# Patient Record
Sex: MALE | Race: WHITE | NOT HISPANIC OR LATINO | Employment: FULL TIME | URBAN - METROPOLITAN AREA
[De-identification: names, ages, dates, MRNs, and addresses within clinical notes are randomized per-mention and may not be internally consistent; named-entity substitution may affect disease eponyms.]

---

## 2020-03-04 ENCOUNTER — HOSPITAL ENCOUNTER (INPATIENT)
Facility: HOSPITAL | Age: 60
LOS: 7 days | Discharge: HOME | DRG: 854 | End: 2020-03-11
Attending: EMERGENCY MEDICINE | Admitting: STUDENT IN AN ORGANIZED HEALTH CARE EDUCATION/TRAINING PROGRAM
Payer: COMMERCIAL

## 2020-03-04 ENCOUNTER — APPOINTMENT (EMERGENCY)
Dept: RADIOLOGY | Facility: HOSPITAL | Age: 60
DRG: 854 | End: 2020-03-04
Payer: COMMERCIAL

## 2020-03-04 DIAGNOSIS — H66.90 OTITIS MEDIA: Primary | ICD-10-CM

## 2020-03-04 DIAGNOSIS — H70.91 MASTOIDITIS OF RIGHT SIDE: ICD-10-CM

## 2020-03-04 DIAGNOSIS — C83.30 DIFFUSE LARGE B CELL LYMPHOMA (HCC): ICD-10-CM

## 2020-03-04 DIAGNOSIS — H70.001 ACUTE MASTOIDITIS OF RIGHT SIDE: ICD-10-CM

## 2020-03-04 DIAGNOSIS — R09.82 POST-NASAL DRIP: ICD-10-CM

## 2020-03-04 DIAGNOSIS — R42 VERTIGO: ICD-10-CM

## 2020-03-04 PROBLEM — A41.9 SEPSIS (HCC): Status: ACTIVE | Noted: 2020-03-04

## 2020-03-04 PROBLEM — E66.9 OBESITY: Status: ACTIVE | Noted: 2020-03-04

## 2020-03-04 LAB
ALBUMIN SERPL BCP-MCNC: 3.9 G/DL (ref 3.5–5)
ALP SERPL-CCNC: 83 U/L (ref 46–116)
ALT SERPL W P-5'-P-CCNC: 23 U/L (ref 12–78)
ANION GAP SERPL CALCULATED.3IONS-SCNC: 8 MMOL/L (ref 4–13)
APTT PPP: 40 SECONDS (ref 23–37)
AST SERPL W P-5'-P-CCNC: 11 U/L (ref 5–45)
BASOPHILS # BLD AUTO: 0.03 THOUSANDS/ΜL (ref 0–0.1)
BASOPHILS NFR BLD AUTO: 0 % (ref 0–1)
BILIRUB SERPL-MCNC: 0.9 MG/DL (ref 0.2–1)
BUN SERPL-MCNC: 10 MG/DL (ref 5–25)
CALCIUM SERPL-MCNC: 9.1 MG/DL (ref 8.3–10.1)
CHLORIDE SERPL-SCNC: 100 MMOL/L (ref 100–108)
CO2 SERPL-SCNC: 29 MMOL/L (ref 21–32)
CREAT SERPL-MCNC: 1.01 MG/DL (ref 0.6–1.3)
EOSINOPHIL # BLD AUTO: 0 THOUSAND/ΜL (ref 0–0.61)
EOSINOPHIL NFR BLD AUTO: 0 % (ref 0–6)
ERYTHROCYTE [DISTWIDTH] IN BLOOD BY AUTOMATED COUNT: 12.6 % (ref 11.6–15.1)
GFR SERPL CREATININE-BSD FRML MDRD: 81 ML/MIN/1.73SQ M
GLUCOSE SERPL-MCNC: 144 MG/DL (ref 65–140)
HCT VFR BLD AUTO: 45 % (ref 36.5–49.3)
HGB BLD-MCNC: 15 G/DL (ref 12–17)
IMM GRANULOCYTES # BLD AUTO: 0.08 THOUSAND/UL (ref 0–0.2)
IMM GRANULOCYTES NFR BLD AUTO: 1 % (ref 0–2)
INR PPP: 1.07 (ref 0.91–1.09)
LYMPHOCYTES # BLD AUTO: 0.81 THOUSANDS/ΜL (ref 0.6–4.47)
LYMPHOCYTES NFR BLD AUTO: 5 % (ref 14–44)
MCH RBC QN AUTO: 31 PG (ref 26.8–34.3)
MCHC RBC AUTO-ENTMCNC: 33.3 G/DL (ref 31.4–37.4)
MCV RBC AUTO: 93 FL (ref 82–98)
MONOCYTES # BLD AUTO: 0.83 THOUSAND/ΜL (ref 0.17–1.22)
MONOCYTES NFR BLD AUTO: 5 % (ref 4–12)
NEUTROPHILS # BLD AUTO: 14.98 THOUSANDS/ΜL (ref 1.85–7.62)
NEUTS SEG NFR BLD AUTO: 89 % (ref 43–75)
NRBC BLD AUTO-RTO: 0 /100 WBCS
PLATELET # BLD AUTO: 153 THOUSANDS/UL (ref 149–390)
PMV BLD AUTO: 8.3 FL (ref 8.9–12.7)
POTASSIUM SERPL-SCNC: 4.4 MMOL/L (ref 3.5–5.3)
PROT SERPL-MCNC: 7.2 G/DL (ref 6.4–8.2)
PROTHROMBIN TIME: 11.5 SECONDS (ref 9.8–12)
RBC # BLD AUTO: 4.84 MILLION/UL (ref 3.88–5.62)
SODIUM SERPL-SCNC: 137 MMOL/L (ref 136–145)
TROPONIN I SERPL-MCNC: <0.02 NG/ML
WBC # BLD AUTO: 16.73 THOUSAND/UL (ref 4.31–10.16)

## 2020-03-04 PROCEDURE — 99285 EMERGENCY DEPT VISIT HI MDM: CPT | Performed by: EMERGENCY MEDICINE

## 2020-03-04 PROCEDURE — 85730 THROMBOPLASTIN TIME PARTIAL: CPT | Performed by: EMERGENCY MEDICINE

## 2020-03-04 PROCEDURE — 70498 CT ANGIOGRAPHY NECK: CPT

## 2020-03-04 PROCEDURE — 80053 COMPREHEN METABOLIC PANEL: CPT | Performed by: EMERGENCY MEDICINE

## 2020-03-04 PROCEDURE — 70496 CT ANGIOGRAPHY HEAD: CPT

## 2020-03-04 PROCEDURE — 99223 1ST HOSP IP/OBS HIGH 75: CPT | Performed by: NURSE PRACTITIONER

## 2020-03-04 PROCEDURE — 84484 ASSAY OF TROPONIN QUANT: CPT | Performed by: EMERGENCY MEDICINE

## 2020-03-04 PROCEDURE — 96375 TX/PRO/DX INJ NEW DRUG ADDON: CPT

## 2020-03-04 PROCEDURE — 85025 COMPLETE CBC W/AUTO DIFF WBC: CPT | Performed by: EMERGENCY MEDICINE

## 2020-03-04 PROCEDURE — 96374 THER/PROPH/DIAG INJ IV PUSH: CPT

## 2020-03-04 PROCEDURE — 87040 BLOOD CULTURE FOR BACTERIA: CPT | Performed by: EMERGENCY MEDICINE

## 2020-03-04 PROCEDURE — 93005 ELECTROCARDIOGRAM TRACING: CPT

## 2020-03-04 PROCEDURE — 36415 COLL VENOUS BLD VENIPUNCTURE: CPT | Performed by: EMERGENCY MEDICINE

## 2020-03-04 PROCEDURE — 99285 EMERGENCY DEPT VISIT HI MDM: CPT

## 2020-03-04 PROCEDURE — 85610 PROTHROMBIN TIME: CPT | Performed by: EMERGENCY MEDICINE

## 2020-03-04 RX ORDER — ONDANSETRON 2 MG/ML
4 INJECTION INTRAMUSCULAR; INTRAVENOUS EVERY 6 HOURS PRN
Status: DISCONTINUED | OUTPATIENT
Start: 2020-03-04 | End: 2020-03-11 | Stop reason: HOSPADM

## 2020-03-04 RX ORDER — MAGNESIUM HYDROXIDE/ALUMINUM HYDROXICE/SIMETHICONE 120; 1200; 1200 MG/30ML; MG/30ML; MG/30ML
30 SUSPENSION ORAL EVERY 6 HOURS PRN
Status: DISCONTINUED | OUTPATIENT
Start: 2020-03-04 | End: 2020-03-11 | Stop reason: HOSPADM

## 2020-03-04 RX ORDER — ONDANSETRON 2 MG/ML
4 INJECTION INTRAMUSCULAR; INTRAVENOUS ONCE
Status: COMPLETED | OUTPATIENT
Start: 2020-03-04 | End: 2020-03-04

## 2020-03-04 RX ORDER — ACETAMINOPHEN 325 MG/1
650 TABLET ORAL EVERY 6 HOURS PRN
Status: DISCONTINUED | OUTPATIENT
Start: 2020-03-04 | End: 2020-03-05

## 2020-03-04 RX ORDER — AZELASTINE 1 MG/ML
1 SPRAY, METERED NASAL 2 TIMES DAILY
Status: DISCONTINUED | OUTPATIENT
Start: 2020-03-04 | End: 2020-03-11 | Stop reason: HOSPADM

## 2020-03-04 RX ORDER — FLUTICASONE PROPIONATE 50 MCG
1 SPRAY, SUSPENSION (ML) NASAL DAILY
Status: DISCONTINUED | OUTPATIENT
Start: 2020-03-05 | End: 2020-03-11 | Stop reason: HOSPADM

## 2020-03-04 RX ORDER — KETOROLAC TROMETHAMINE 30 MG/ML
15 INJECTION, SOLUTION INTRAMUSCULAR; INTRAVENOUS EVERY 6 HOURS PRN
Status: DISCONTINUED | OUTPATIENT
Start: 2020-03-04 | End: 2020-03-05

## 2020-03-04 RX ORDER — DIAZEPAM 5 MG/1
5 TABLET ORAL EVERY 6 HOURS PRN
Status: DISCONTINUED | OUTPATIENT
Start: 2020-03-04 | End: 2020-03-09

## 2020-03-04 RX ORDER — KETOROLAC TROMETHAMINE 30 MG/ML
30 INJECTION, SOLUTION INTRAMUSCULAR; INTRAVENOUS ONCE
Status: COMPLETED | OUTPATIENT
Start: 2020-03-04 | End: 2020-03-04

## 2020-03-04 RX ORDER — LORATADINE 10 MG/1
10 TABLET ORAL DAILY
Status: DISCONTINUED | OUTPATIENT
Start: 2020-03-05 | End: 2020-03-11 | Stop reason: HOSPADM

## 2020-03-04 RX ORDER — ONDANSETRON 2 MG/ML
INJECTION INTRAMUSCULAR; INTRAVENOUS
Status: DISPENSED
Start: 2020-03-04 | End: 2020-03-05

## 2020-03-04 RX ORDER — ACYCLOVIR 200 MG/1
400 CAPSULE ORAL EVERY 12 HOURS SCHEDULED
Status: DISCONTINUED | OUTPATIENT
Start: 2020-03-04 | End: 2020-03-11 | Stop reason: HOSPADM

## 2020-03-04 RX ORDER — PSEUDOEPHEDRINE HYDROCHLORIDE 30 MG/1
60 TABLET ORAL EVERY 6 HOURS PRN
Status: DISCONTINUED | OUTPATIENT
Start: 2020-03-04 | End: 2020-03-08

## 2020-03-04 RX ORDER — CEFAZOLIN SODIUM 2 G/50ML
2000 SOLUTION INTRAVENOUS EVERY 8 HOURS
Status: DISCONTINUED | OUTPATIENT
Start: 2020-03-04 | End: 2020-03-09 | Stop reason: ALTCHOICE

## 2020-03-04 RX ORDER — DIAZEPAM 5 MG/1
5 TABLET ORAL ONCE
Status: COMPLETED | OUTPATIENT
Start: 2020-03-04 | End: 2020-03-04

## 2020-03-04 RX ADMIN — VANCOMYCIN HYDROCHLORIDE 1250 MG: 10 INJECTION, POWDER, LYOPHILIZED, FOR SOLUTION INTRAVENOUS at 21:15

## 2020-03-04 RX ADMIN — ONDANSETRON 4 MG: 2 INJECTION INTRAMUSCULAR; INTRAVENOUS at 16:55

## 2020-03-04 RX ADMIN — KETOROLAC TROMETHAMINE 30 MG: 30 INJECTION, SOLUTION INTRAMUSCULAR at 18:11

## 2020-03-04 RX ADMIN — IOHEXOL 85 ML: 350 INJECTION, SOLUTION INTRAVENOUS at 18:45

## 2020-03-04 RX ADMIN — DIAZEPAM 5 MG: 5 TABLET ORAL at 16:55

## 2020-03-04 RX ADMIN — ONDANSETRON 4 MG: 2 INJECTION INTRAMUSCULAR; INTRAVENOUS at 20:08

## 2020-03-04 RX ADMIN — AZELASTINE HYDROCHLORIDE 1 SPRAY: 137 SPRAY, METERED NASAL at 23:44

## 2020-03-04 RX ADMIN — CEFAZOLIN SODIUM 2000 MG: 2 SOLUTION INTRAVENOUS at 23:32

## 2020-03-04 RX ADMIN — Medication 4.5 G: at 20:17

## 2020-03-04 NOTE — ED NOTES
Patient transported to [address] [place] [address], [address] [place] [address]  03/04/20 7148

## 2020-03-04 NOTE — ED PROVIDER NOTES
History  Chief Complaint   Patient presents with    Earache     c/o R ear discomfort with fluid in the ear for 6 weeks has seen ent and has follow up, balance getting worse, today had headache and started vomiting     62 yo male  from [place] c/o right ear fullness with associated dizziness x 6 weeks  Seen at urgent care center initially and had ear wax removed  No improvement so saw ENT who told him he had fluid in the middle ear and put him on antihistamine and did a hearing test   Yesterday, dizziness and right ear pressure got severely worse  This morning was tolerable so drove to [facility] and did delivery but at noon (4 75 hours ago) felt worse again  Had spinning dizziness with nausea and vomiting when he moved and right ear is now painful  He can't walk or drive  He says he tried to call local ENTs but couldn't get appointment to see anyone right away  He called his ENT in [place] who told him to come to ER  He wants someone to do surgery and place a tube in the ear right now  No chest pain, fever, sob  History provided by:  Patient   used: No    Earache   Associated symptoms: headaches and vomiting    Associated symptoms: no abdominal pain, no congestion, no cough, no diarrhea, no fever, no hearing loss, no rash and no sore throat        None       Past Medical History:   Diagnosis Date    Lymphoma [facility])        Past Surgical History:   Procedure Laterality Date    GALLBLADDER SURGERY         History reviewed  No pertinent family history  I have reviewed and agree with the history as documented  E-Cigarette/Vaping    E-Cigarette Use Never User      E-Cigarette/Vaping Substances     Social History     Tobacco Use    Smoking status: Former Smoker    Smokeless tobacco: Never Used   Substance Use Topics    Alcohol use: Not Currently    Drug use: Not Currently       Review of Systems   Constitutional: Negative  Negative for chills and fever     HENT: Positive for ear pain  Negative for congestion, hearing loss and sore throat  Eyes: Negative  Respiratory: Negative  Negative for cough and shortness of breath  Cardiovascular: Negative  Negative for chest pain and leg swelling  Gastrointestinal: Positive for nausea and vomiting  Negative for abdominal pain and diarrhea  Genitourinary: Negative  Negative for dysuria, flank pain and hematuria  Musculoskeletal: Negative  Negative for back pain and myalgias  Skin: Negative  Negative for rash and wound  Neurological: Positive for dizziness and headaches  Negative for syncope  Psychiatric/Behavioral: Negative  Negative for confusion and hallucinations  The patient is not nervous/anxious  All other systems reviewed and are negative  Physical Exam  Physical Exam   Constitutional: He is oriented to person, place, and time  He appears well-developed and well-nourished  He appears distressed  HENT:   Head: Normocephalic and atraumatic  Right Ear: Tympanic membrane is erythematous  Left Ear: Tympanic membrane normal    Right TM is red in center, vascular around edges, appears yellowed and thickened and can't see landmarks behind it   + ttp behind right ear/mastoid area but area is not grossly red or warm to touch  Eyes: Pupils are equal, round, and reactive to light  Conjunctivae and EOM are normal  No scleral icterus  Neck: Normal range of motion  Neck supple  Cardiovascular: Normal rate, regular rhythm and normal heart sounds  No murmur heard  Pulmonary/Chest: Effort normal  No respiratory distress  He has decreased breath sounds  He exhibits no tenderness  Abdominal: Soft  Bowel sounds are normal  He exhibits no distension  There is no tenderness  Musculoskeletal: Normal range of motion  He exhibits no edema, tenderness or deformity  Neurological: He is alert and oriented to person, place, and time  No cranial nerve deficit  He exhibits normal muscle tone     Speech intact, finger to nose is a little tremulous but he can do it  Motor intact throughout  No drift, no droop   Skin: Skin is warm and dry  No rash noted  He is not diaphoretic  No erythema  No pallor  Psychiatric: He has a normal mood and affect  His behavior is normal    Nursing note and vitals reviewed        Vital Signs  ED Triage Vitals [03/04/20 1620]   Temperature Pulse Respirations Blood Pressure SpO2   98 8 °F (37 1 °C) (!) 108 20 (!) 189/105 95 %      Temp Source Heart Rate Source Patient Position - Orthostatic VS BP Location FiO2 (%)   Tympanic Monitor Sitting Right arm --      Pain Score       Worst Possible Pain           Vitals:    03/04/20 1745 03/04/20 1800 03/04/20 1815 03/04/20 1913   BP:    128/80   Pulse: 104 105 105 103   Patient Position - Orthostatic VS:    Lying         Visual Acuity      ED Medications  Medications   piperacillin-tazobactam (ZOSYN) IVPB 4 5 g (has no administration in time range)   vancomycin (VANCOCIN) 1,250 mg in sodium chloride 0 9 % 250 mL IVPB (has no administration in time range)   diazepam (VALIUM) tablet 5 mg (5 mg Oral Given 3/4/20 1655)   ondansetron (ZOFRAN) injection 4 mg (4 mg Intravenous Given 3/4/20 1655)   ketorolac (TORADOL) injection 30 mg (30 mg Intravenous Given 3/4/20 1811)   iohexol (OMNIPAQUE) 350 MG/ML injection (MULTI-DOSE) 85 mL (85 mL Intravenous Given 3/4/20 1845)       Diagnostic Studies  Results Reviewed     Procedure Component Value Units Date/Time    Blood culture #1 [[id number]]     Lab Status:  No result Specimen:  Blood     Blood culture #2 [[id number]]     Lab Status:  No result Specimen:  Blood     Protime-INR [[id number]]  (Normal) Collected:  03/04/20 1651    Lab Status:  Final result Specimen:  Blood from Arm, Left Updated:  03/04/20 1720     Protime 11 5 seconds      INR 1 07    APTT [[id number]]  (Abnormal) Collected:  03/04/20 1651    Lab Status:  Final result Specimen:  Blood from Arm, Left Updated:  03/04/20 1720     PTT 40 seconds     Troponin I [[id number]]  (Normal) Collected:  03/04/20 1651    Lab Status:  Final result Specimen:  Blood from Arm, Left Updated:  03/04/20 1719     Troponin I <0 02 ng/mL     Comprehensive metabolic panel [[id number]]  (Abnormal) Collected:  03/04/20 1651    Lab Status:  Final result Specimen:  Blood from Arm, Left Updated:  03/04/20 1715     Sodium 137 mmol/L      Potassium 4 4 mmol/L      Chloride 100 mmol/L      CO2 29 mmol/L      ANION GAP 8 mmol/L      BUN 10 mg/dL      Creatinine 1 01 mg/dL      Glucose 144 mg/dL      Calcium 9 1 mg/dL      AST 11 U/L      ALT 23 U/L      Alkaline Phosphatase 83 U/L      Total Protein 7 2 g/dL      Albumin 3 9 g/dL      Total Bilirubin 0 90 mg/dL      eGFR 81 ml/min/1 73sq m     Narrative:       [facility] guidelines for Chronic Kidney Disease (CKD):     Stage 1 with normal or high GFR (GFR > 90 mL/min/1 73 square meters)    Stage 2 Mild CKD (GFR = 60-89 mL/min/1 73 square meters)    Stage 3A Moderate CKD (GFR = 45-59 mL/min/1 73 square meters)    Stage 3B Moderate CKD (GFR = 30-44 mL/min/1 73 square meters)    Stage 4 Severe CKD (GFR = 15-29 mL/min/1 73 square meters)    Stage 5 End Stage CKD (GFR <15 mL/min/1 73 square meters)  Note: GFR calculation is accurate only with a steady state creatinine    CBC and differential [[id number]]  (Abnormal) Collected:  03/04/20 1651    Lab Status:  Final result Specimen:  Blood from Arm, Left Updated:  03/04/20 1657     WBC 16 73 Thousand/uL      RBC 4 84 Million/uL      Hemoglobin 15 0 g/dL      Hematocrit 45 0 %      MCV 93 fL      MCH 31 0 pg      MCHC 33 3 g/dL      RDW 12 6 %      MPV 8 3 fL      Platelets 177 Thousands/uL      nRBC 0 /100 WBCs      Neutrophils Relative 89 %      Immat GRANS % 1 %      Lymphocytes Relative 5 %      Monocytes Relative 5 %      Eosinophils Relative 0 %      Basophils Relative 0 %      Neutrophils Absolute 14 98 Thousands/µL      Immature Grans Absolute 0 08 Thousand/uL Lymphocytes Absolute 0 81 Thousands/µL      Monocytes Absolute 0 83 Thousand/µL      Eosinophils Absolute 0 00 Thousand/µL      Basophils Absolute 0 03 Thousands/µL                  CTA head and neck with and without contrast   Final Result by Brittney Elliott MD (03/04 1914)         1  No evidence of acute intracranial hemorrhage  2  No evidence of hemodynamic significant stenosis, aneurysm or dissection  3  Suspect right otomastoiditis  4  Pansinusitis  Workstation performed: [id number]                    Procedures  ECG 12 Lead Documentation Only  Date/Time: 3/4/2020 4:56 PM  Performed by: Naa Carrillo MD  Authorized by: Naa Carrillo MD     Indications / Diagnosis:  Dizziness  ECG reviewed by me, the ED Provider: yes    Patient location:  ED  Previous ECG:     Previous ECG:  Unavailable  Interpretation:     Interpretation: abnormal    Rate:     ECG rate:  107    ECG rate assessment: tachycardic    Rhythm:     Rhythm: sinus tachycardia    Ectopy:     Ectopy: PAC    QRS:     QRS axis:  Normal  Conduction:     Conduction: normal    ST segments:     ST segments:  Normal  T waves:     T waves: normal    Q waves:     Q waves:  III  Comments:      Low voltage             ED Course                               MDM  Number of Diagnoses or Management Options  Mastoiditis of right side:   Otitis media:   Vertigo:   Diagnosis management comments: 80 - discussed with ENT on call who will see pt  Tomorrow  Will give IV abx  Pt  Does feel a little better after the valium, he is able to lay down comfortably but still got very dizzy when he had to move for the CT scan    Will admit to hospitalist         Disposition  Final diagnoses:   Otitis media   Mastoiditis of right side   Vertigo     Time reflects when diagnosis was documented in both MDM as applicable and the Disposition within this note     Time User Action Codes Description Comment    7/6/7640  6:11 PM Coby SHULTZ Add [I17 32] Otitis media 3/9/6080  4:97 PM Jonnathan SHULTZ Add [O56 77] Mastoiditis of right side     1/7/8933  0:57 PM Jonnathan SHULTZ Add [J38] Vertigo       ED Disposition     ED Disposition Condition Date/Time Comment    Admit Stable Wed Mar 4, 2020  7:47 PM Case was discussed with **hospitalist* and the patient's admission status was agreed to be Admission Status: inpatient status        Follow-up Information    None         Patient's Medications    No medications on file     No discharge procedures on file      PDMP Review     None          ED Provider  Electronically Signed by           Dinh Tucker MD  90/63/89 5976       Dinh Tucker MD  13/77/07 6622

## 2020-03-04 NOTE — ED NOTES
Pt requested of pain medication for his L ear, Dr Sadie Kowalski made aware  Pt is awaiting to go to CT        Ivelisse Tafoya RN  03/04/20 7698

## 2020-03-05 PROBLEM — E66.09 CLASS 2 OBESITY DUE TO EXCESS CALORIES WITHOUT SERIOUS COMORBIDITY WITH BODY MASS INDEX (BMI) OF 35.0 TO 35.9 IN ADULT: Status: ACTIVE | Noted: 2020-03-04

## 2020-03-05 LAB
ALBUMIN SERPL BCP-MCNC: 3.3 G/DL (ref 3.5–5)
ALP SERPL-CCNC: 70 U/L (ref 46–116)
ALT SERPL W P-5'-P-CCNC: 18 U/L (ref 12–78)
ANION GAP SERPL CALCULATED.3IONS-SCNC: 8 MMOL/L (ref 4–13)
AST SERPL W P-5'-P-CCNC: 11 U/L (ref 5–45)
ATRIAL RATE: 107 BPM
BASOPHILS # BLD AUTO: 0.02 THOUSANDS/ΜL (ref 0–0.1)
BASOPHILS NFR BLD AUTO: 0 % (ref 0–1)
BILIRUB SERPL-MCNC: 0.8 MG/DL (ref 0.2–1)
BUN SERPL-MCNC: 13 MG/DL (ref 5–25)
CALCIUM SERPL-MCNC: 8.5 MG/DL (ref 8.3–10.1)
CHLORIDE SERPL-SCNC: 101 MMOL/L (ref 100–108)
CO2 SERPL-SCNC: 28 MMOL/L (ref 21–32)
CREAT SERPL-MCNC: 1.09 MG/DL (ref 0.6–1.3)
EOSINOPHIL # BLD AUTO: 0.01 THOUSAND/ΜL (ref 0–0.61)
EOSINOPHIL NFR BLD AUTO: 0 % (ref 0–6)
ERYTHROCYTE [DISTWIDTH] IN BLOOD BY AUTOMATED COUNT: 12.6 % (ref 11.6–15.1)
GFR SERPL CREATININE-BSD FRML MDRD: 74 ML/MIN/1.73SQ M
GLUCOSE SERPL-MCNC: 101 MG/DL (ref 65–140)
HCT VFR BLD AUTO: 42.9 % (ref 36.5–49.3)
HGB BLD-MCNC: 14.1 G/DL (ref 12–17)
IMM GRANULOCYTES # BLD AUTO: 0.09 THOUSAND/UL (ref 0–0.2)
IMM GRANULOCYTES NFR BLD AUTO: 1 % (ref 0–2)
LYMPHOCYTES # BLD AUTO: 1.05 THOUSANDS/ΜL (ref 0.6–4.47)
LYMPHOCYTES NFR BLD AUTO: 8 % (ref 14–44)
MAGNESIUM SERPL-MCNC: 2.1 MG/DL (ref 1.6–2.6)
MCH RBC QN AUTO: 30.9 PG (ref 26.8–34.3)
MCHC RBC AUTO-ENTMCNC: 32.9 G/DL (ref 31.4–37.4)
MCV RBC AUTO: 94 FL (ref 82–98)
MONOCYTES # BLD AUTO: 1.08 THOUSAND/ΜL (ref 0.17–1.22)
MONOCYTES NFR BLD AUTO: 8 % (ref 4–12)
NEUTROPHILS # BLD AUTO: 10.55 THOUSANDS/ΜL (ref 1.85–7.62)
NEUTS SEG NFR BLD AUTO: 83 % (ref 43–75)
NRBC BLD AUTO-RTO: 0 /100 WBCS
P AXIS: 54 DEGREES
PHOSPHATE SERPL-MCNC: 2.5 MG/DL (ref 2.7–4.5)
PLATELET # BLD AUTO: 159 THOUSANDS/UL (ref 149–390)
PMV BLD AUTO: 8.5 FL (ref 8.9–12.7)
POTASSIUM SERPL-SCNC: 3.6 MMOL/L (ref 3.5–5.3)
PR INTERVAL: 162 MS
PROT SERPL-MCNC: 6.3 G/DL (ref 6.4–8.2)
QRS AXIS: 32 DEGREES
QRSD INTERVAL: 84 MS
QT INTERVAL: 344 MS
QTC INTERVAL: 459 MS
RBC # BLD AUTO: 4.57 MILLION/UL (ref 3.88–5.62)
SODIUM SERPL-SCNC: 137 MMOL/L (ref 136–145)
T WAVE AXIS: 31 DEGREES
VENTRICULAR RATE: 107 BPM
WBC # BLD AUTO: 12.8 THOUSAND/UL (ref 4.31–10.16)

## 2020-03-05 PROCEDURE — 99221 1ST HOSP IP/OBS SF/LOW 40: CPT | Performed by: PHYSICIAN ASSISTANT

## 2020-03-05 PROCEDURE — 84100 ASSAY OF PHOSPHORUS: CPT | Performed by: NURSE PRACTITIONER

## 2020-03-05 PROCEDURE — 93010 ELECTROCARDIOGRAM REPORT: CPT | Performed by: INTERNAL MEDICINE

## 2020-03-05 PROCEDURE — 83735 ASSAY OF MAGNESIUM: CPT | Performed by: NURSE PRACTITIONER

## 2020-03-05 PROCEDURE — 80053 COMPREHEN METABOLIC PANEL: CPT | Performed by: NURSE PRACTITIONER

## 2020-03-05 PROCEDURE — 99233 SBSQ HOSP IP/OBS HIGH 50: CPT | Performed by: STUDENT IN AN ORGANIZED HEALTH CARE EDUCATION/TRAINING PROGRAM

## 2020-03-05 PROCEDURE — 85025 COMPLETE CBC W/AUTO DIFF WBC: CPT | Performed by: NURSE PRACTITIONER

## 2020-03-05 RX ORDER — OXYCODONE HYDROCHLORIDE 5 MG/1
5 TABLET ORAL EVERY 4 HOURS PRN
Status: DISCONTINUED | OUTPATIENT
Start: 2020-03-05 | End: 2020-03-09

## 2020-03-05 RX ORDER — PROMETHAZINE HYDROCHLORIDE 25 MG/ML
12.5 INJECTION, SOLUTION INTRAMUSCULAR; INTRAVENOUS ONCE
Status: COMPLETED | OUTPATIENT
Start: 2020-03-06 | End: 2020-03-06

## 2020-03-05 RX ORDER — ACETAMINOPHEN 325 MG/1
975 TABLET ORAL EVERY 6 HOURS SCHEDULED
Status: DISCONTINUED | OUTPATIENT
Start: 2020-03-05 | End: 2020-03-09

## 2020-03-05 RX ORDER — KETOROLAC TROMETHAMINE 30 MG/ML
15 INJECTION, SOLUTION INTRAMUSCULAR; INTRAVENOUS EVERY 6 HOURS SCHEDULED
Status: COMPLETED | OUTPATIENT
Start: 2020-03-05 | End: 2020-03-06

## 2020-03-05 RX ORDER — OXYCODONE HYDROCHLORIDE AND ACETAMINOPHEN 5; 325 MG/1; MG/1
1 TABLET ORAL EVERY 8 HOURS PRN
Status: DISCONTINUED | OUTPATIENT
Start: 2020-03-05 | End: 2020-03-05

## 2020-03-05 RX ADMIN — FLUTICASONE PROPIONATE 1 SPRAY: 50 SPRAY, METERED NASAL at 08:24

## 2020-03-05 RX ADMIN — LORATADINE 10 MG: 10 TABLET ORAL at 08:23

## 2020-03-05 RX ADMIN — METRONIDAZOLE 500 MG: 500 INJECTION, SOLUTION INTRAVENOUS at 08:29

## 2020-03-05 RX ADMIN — ONDANSETRON 4 MG: 2 INJECTION INTRAMUSCULAR; INTRAVENOUS at 12:29

## 2020-03-05 RX ADMIN — DIAZEPAM 5 MG: 5 TABLET ORAL at 08:00

## 2020-03-05 RX ADMIN — OXYCODONE HYDROCHLORIDE 5 MG: 5 TABLET ORAL at 11:33

## 2020-03-05 RX ADMIN — KETOROLAC TROMETHAMINE 15 MG: 30 INJECTION, SOLUTION INTRAMUSCULAR at 00:10

## 2020-03-05 RX ADMIN — ACYCLOVIR 400 MG: 200 CAPSULE ORAL at 08:25

## 2020-03-05 RX ADMIN — ENOXAPARIN SODIUM 40 MG: 40 INJECTION SUBCUTANEOUS at 08:23

## 2020-03-05 RX ADMIN — PSEUDOEPHEDRINE HCL 60 MG: 30 TABLET, COATED ORAL at 08:27

## 2020-03-05 RX ADMIN — METRONIDAZOLE 500 MG: 500 INJECTION, SOLUTION INTRAVENOUS at 00:10

## 2020-03-05 RX ADMIN — KETOROLAC TROMETHAMINE 15 MG: 30 INJECTION, SOLUTION INTRAMUSCULAR at 18:43

## 2020-03-05 RX ADMIN — CEFAZOLIN SODIUM 2000 MG: 2 SOLUTION INTRAVENOUS at 15:35

## 2020-03-05 RX ADMIN — KETOROLAC TROMETHAMINE 15 MG: 30 INJECTION, SOLUTION INTRAMUSCULAR at 11:33

## 2020-03-05 RX ADMIN — ONDANSETRON 4 MG: 2 INJECTION INTRAMUSCULAR; INTRAVENOUS at 18:55

## 2020-03-05 RX ADMIN — METRONIDAZOLE 500 MG: 500 INJECTION, SOLUTION INTRAVENOUS at 16:30

## 2020-03-05 RX ADMIN — ACETAMINOPHEN 975 MG: 325 TABLET, FILM COATED ORAL at 23:46

## 2020-03-05 RX ADMIN — ACYCLOVIR 400 MG: 200 CAPSULE ORAL at 21:35

## 2020-03-05 RX ADMIN — CEFAZOLIN SODIUM 2000 MG: 2 SOLUTION INTRAVENOUS at 07:37

## 2020-03-05 RX ADMIN — ACETAMINOPHEN 975 MG: 325 TABLET, FILM COATED ORAL at 11:33

## 2020-03-05 RX ADMIN — OXYCODONE HYDROCHLORIDE AND ACETAMINOPHEN 1 TABLET: 5; 325 TABLET ORAL at 05:25

## 2020-03-05 RX ADMIN — CEFAZOLIN SODIUM 2000 MG: 2 SOLUTION INTRAVENOUS at 23:51

## 2020-03-05 RX ADMIN — AZELASTINE HYDROCHLORIDE 1 SPRAY: 137 SPRAY, METERED NASAL at 18:45

## 2020-03-05 RX ADMIN — ACYCLOVIR 400 MG: 200 CAPSULE ORAL at 00:10

## 2020-03-05 NOTE — ASSESSMENT & PLAN NOTE
Has been in remission since 2015  · Continue home medication, Acyclovir 400 mg twice daily  · Outpatient Oncology follow-up

## 2020-03-05 NOTE — PROGRESS NOTES
Progress Note Gregory Blair 1960, 61 y o  male MRN: [id number]    Unit/Bed#: [address] [place] [address] Encounter: [id number]    Primary Care Provider: No primary care provider on file  Date and time admitted to hospital: 3/4/2020  4:24 PM        Sepsis [facility])  Assessment & Plan  POA, as evidence by tachycardia and leukocytosis in the setting of otomastoiditis   · Follow-up blood cultures  · Monitor WBC- downtrending  · Patient remains afebrile    * Acute mastoiditis of right side  Assessment & Plan  Hst of post nasal drip and right middle ear effusion being treated by ENT with antihistamines and decongestants   Now with worsening pain, vertigo, nausea, and vomiting  CTA head and neck: 'Suspect right otomastoiditis  Pansinusitis '  ER provider spoke with on call ENT provider who will see the patient tomorrow  Given Zosyn and Vancomycin IV x1 in ED   · continue IV Cefazolin + Flagyl  · Consult ENT, recs appreciated  · Continue home antihistamines and decongestants  · Pain control: change regimen to Tylenol 975mg q8hr, toradol 15mg q6hr, oxycodone 5mg q4hr PRN    Class 2 obesity due to excess calories without serious comorbidity with body mass index (BMI) of 35 0 to 35 9 in adult  Assessment & Plan  Body mass index is 35 8 kg/m²  Would benefit from weight loss    History of diffuse large B cell lymphoma (HCC)  Assessment & Plan  Has been in remission since 2015  · Continue home medication, Acyclovir 400 mg twice daily  · Outpatient Oncology follow-up    Post-nasal drip  Assessment & Plan  Complains of excessive postnasal drip for years  Follows outpatient ENT who has him on Sudafed, Zyrtec, Flonase and nasal washes    Has a follow-up with ENT on 3/13/2020  · Continue Flonase, Claritin, and Sudafed as needed  · Trial Astelin nasal spray  · ENT consulted, recs appreciated    Vertigo  Assessment & Plan  Likely due to acute ostitis media, associated with nausea, vomiting, and gait instability x1 day  Was given Valium 5 mg po x1 in ED with improvement in symptoms  · Meclizine 25 mg q8h  · Valium 5 mg po PRN  · PT eval        VTE Pharmacologic Prophylaxis:   Pharmacologic: Enoxaparin (Lovenox)  Mechanical VTE Prophylaxis in Place: Yes    Patient Centered Rounds: I have performed bedside rounds with nursing staff today  Discussions with Specialists or Other Care Team Provider: Yes  Education and Discussions with Family / Patient:Yes  Time Spent for Care: 45 minutes  More than 50% of total time spent on counseling and coordination of care as described above  Current Length of Stay: 1 day(s)  Current Patient Status: Inpatient     Discharge Plan: pending    Code Status: Level 1 - Full Code      Subjective:   Patients right ear pain is much worse this morning  He also has some drainage and its wet  His hearing on that side is decreased  Objective:     Vitals:   Temp (24hrs), Av 8 °F (37 1 °C), Min:97 8 °F (36 6 °C), Max:99 5 °F (37 5 °C)    Temp:  [97 8 °F (36 6 °C)-99 5 °F (37 5 °C)] 97 8 °F (36 6 °C)  HR:  [] 92  Resp:  [13-20] 16  BP: (118-189)/() 121/74  SpO2:  [90 %-97 %] 92 %  Body mass index is 35 8 kg/m²  Input and Output Summary (last 24 hours): Intake/Output Summary (Last 24 hours) at 3/5/2020 1116  Last data filed at 3/5/2020 0838  Gross per 24 hour   Intake    Output 200 ml   Net -200 ml        Physical Exam:     Physical Exam   Constitutional: He is oriented to person, place, and time  He appears well-developed  No distress  HENT:   Head: Normocephalic and atraumatic  Right Ear: There is drainage, swelling and tenderness  There is mastoid tenderness  Tympanic membrane is erythematous  Tympanic membrane mobility is abnormal  Decreased hearing is noted  Cardiovascular: Normal rate, regular rhythm and normal heart sounds  No murmur heard  Pulmonary/Chest: Effort normal and breath sounds normal  No respiratory distress  He has no wheezes  He has no rales     Clear but decreased Abdominal: Soft  Bowel sounds are normal  He exhibits no distension  There is no tenderness  There is no rebound  Musculoskeletal: He exhibits edema (trace)  Neurological: He is alert and oriented to person, place, and time  Skin: Skin is warm and dry  Psychiatric: He has a normal mood and affect  His behavior is normal    Nursing note and vitals reviewed  Additional Data:     Labs:    Results from last 7 days   Lab Units 03/05/20  0524 03/04/20  1651   WBC Thousand/uL 12 80* 16 73*   HEMOGLOBIN g/dL 14 1 15 0   HEMATOCRIT % 42 9 45 0   PLATELETS Thousands/uL 159 153   NEUTROS PCT % 83* 89*     Results from last 7 days   Lab Units 03/05/20  0524 03/04/20  1651   SODIUM mmol/L 137 137   POTASSIUM mmol/L 3 6 4 4   CHLORIDE mmol/L 101 100   CO2 mmol/L 28 29   BUN mg/dL 13 10   CREATININE mg/dL 1 09 1 01   CALCIUM mg/dL 8 5 9 1   TOTAL BILIRUBIN mg/dL 0 80 0 90   ALK PHOS U/L 70 83   ALT U/L 18 23   AST U/L 11 11     Results from last 7 days   Lab Units 03/04/20  1651   INR  1 07     Results from last 7 days   Lab Units 03/04/20  1651   TROPONIN I ng/mL <0 02     No results found for: HGBA1C            * I Have Reviewed All Lab Data Listed Above  * Additional Pertinent Lab Tests Reviewed: All Labs Within Last 24 Hours Reviewed    Imaging:     CTA head and neck with and without contrast   Final Result by Josue Solomon MD (03/04 1914)         1  No evidence of acute intracranial hemorrhage  2  No evidence of hemodynamic significant stenosis, aneurysm or dissection  3  Suspect right otomastoiditis  4  Pansinusitis  Workstation performed: [id number]           Imaging Reports Reviewed by myself    Cultures:   Blood Culture:   Lab Results   Component Value Date    BLOODCX Received in Microbiology Lab  Culture in Progress  03/04/2020    BLOODCX Received in Microbiology Lab  Culture in Progress   03/04/2020     Urine Culture: No results found for: URINECX  Sputum Culture: No components found for: SPUTUMCX  Wound Culture: No results found for: WOUNDCULT    Last 24 Hours Medication List:     Current Facility-Administered Medications:  acetaminophen 650 mg Oral Q6H PRN EDITA Melvin    acyclovir 400 mg Oral Q12H [address] Juany Loza, EDITA    aluminum-magnesium hydroxide-simethicone 30 mL Oral Q6H PRN Juany Loza, EDITA    azelastine 1 spray Each Nare BID Juany Loza, EDITA    cefazolin 2,000 mg Intravenous Q8H Juany Loza, EDITA Last Rate: 2,000 mg (03/05/20 0737)   diazepam 5 mg Oral Q6H PRN Juany Loza, EDITA    enoxaparin 40 mg Subcutaneous Q24H [address] Juany Loza, EDITA    fluticasone 1 spray Each Nare Daily Juany Loza, EDITA    ketorolac 15 mg Intravenous Q6H PRN Juany Loza, EDITA    loratadine 10 mg Oral Daily Juany Loza, EDITA    metroNIDAZOLE 500 mg Intravenous Q8H EDITA Melvin Last Rate: 500 mg (03/05/20 0829)   morphine injection 2 mg Intravenous Q4H PRN Juany Loza, EDITA    ondansetron 4 mg Intravenous Q6H PRN Juany Loza, EDITA    oxyCODONE-acetaminophen 1 tablet Oral Q8H PRN Juany Loza, EDITA    pseudoephedrine 60 mg Oral Q6H PRN EDITA Melvin         Today, Patient Was Seen By: Kal Cardozo MD    ** Please Note: Dragon 360 Dictation voice to text software may have been used in the creation of this document   **

## 2020-03-05 NOTE — H&P
H&P- Bony Brochure 1960, 61 y o  male MRN: [id number]    Unit/Bed#: [address] [place] [address] Encounter: [id number]    Primary Care Provider: No primary care provider on file  Date and time admitted to hospital: 3/4/2020  4:24 PM        * Acute mastoiditis of right side  Assessment & Plan  Hst of post nasal drip and right middle ear effusion being treated by ENT with antihistamines and decongestants   Now with worsening pain, vertigo, nausea, and vomiting  CTA head and neck: 'Suspect right otomastoiditis  Pansinusitis '  ER provider spoke with on call ENT provider who will see the patient tomorrow  Given Zosyn and Vancomycin IV x1 in ED   · Will deescalate antibiotics to IV Cefazolin + Flagyl  · Consult ENT  · Follow-up blood cultures  · Continue home antihistamines and decongestants  · Pain control with Tylenol and Toradol PRN     Sepsis ([address] )  Assessment & Plan  POA, as evidence by tachycardia and leukocytosis in the setting of otomastoiditis   · Continue IV antibiotics as above  · Follow-up blood cultures  · Monitor CBC  · Monitor for worsening signs of infection    Vertigo  Assessment & Plan  Likely due to acute ostitis media   Patient developed vertigo associated with nausea, vomiting, and gait instability this afternoon  Was given Valium 5 mg po x1 in ED with improvement in symptoms  · Meclizine 25 mg q8h  · Valium 5 mg po PRN  · PT eval  · Hold off on IV hydration as patient is eating and drinking well    Post-nasal drip  Assessment & Plan  Complains of excessive postnasal drip for years  Follows outpatient ENT who has him on Sudafed, Zyrtec, Flonase and nasal washes    Has a follow-up with ENT on 3/13/2020  · Continue Flonase, Claritin, and Sudafed as needed  · Trial Astelin nasal spray  · ENT consultation, recommendations appreciated    Obesity  Assessment & Plan  Would benefit from weight loss    History of diffuse large B cell lymphoma (HCC)  Assessment & Plan  Has been in remission since 2015  · Continue home medication, Acyclovir 400 mg twice daily  · Outpatient Oncology follow-up      VTE Prophylaxis: Enoxaparin (Lovenox)  / reason for no mechanical VTE prophylaxis ambulatory    Code Status: Full code, level 1  POLST: POLST form is not discussed and not completed at this time  Discussion with family: None at bedside    Anticipated Length of Stay:  Patient will be admitted on an Inpatient basis with an anticipated length of stay of  Greater than 2 midnights  Justification for Hospital Stay: acute otomastoiditis, vertigo     Total Time for Visit, including Counseling / Coordination of Care: 1 hour  Greater than 50% of this total time spent on direct patient counseling and coordination of care  Chief Complaint:   Right ear pain, nausea, dizziness    History of Present Illness:    Corie Matute is a 61 y o  male  from [place] with a past medical history including diffuse large B-cell lymphoma who presents with worsening right ear pain associated with nausea, vomiting, and ambulatory dysfunction  Patient reports several weeks of postnasal drip, excessive ear wax, and fluid behind his ears  States he had ear wax removed at an urgent care center and then saw ENT due to lack of improvement  ENT started him on Sudafed, Zyrtec, Flonase, and nasal saline washes in effort to "dry out his ear " He states he developed severe right ear pain yesterday morning followed by severe dizziness, nausea, vomiting, and inability to ambulate at lunchtime today  He was at a work stop where he left his truck and someone drove him to [address] [place] [address] Emergency Department for medical evaluation  Workup in the ED included a CTA of the head and neck which revealed right otomastoiditis  The ER provider spoke with the on-call ENT who agreed with IV antibiotics and an ENT evaluation tomorrow  Presently, patient states that he is more comfortable than when he initially came to the emergency department    He states the medications have lowered his right ear pain and improved his nausea and dizziness  He still has some dizziness for which he would like to have further medications overnight  He denies any headache, vision changes, difficulty swallowing, chest pain, shortness of breath, or abdominal pain  Review of Systems:    Review of Systems   Constitutional: Positive for activity change  Negative for appetite change, chills and fever  HENT: Positive for congestion, ear pain and postnasal drip  Negative for rhinorrhea and sore throat  Eyes: Negative for photophobia and visual disturbance  Respiratory: Positive for cough  Negative for chest tightness, shortness of breath and wheezing  Cardiovascular: Negative for chest pain, palpitations and leg swelling  Gastrointestinal: Negative for abdominal distention, abdominal pain, constipation, diarrhea, nausea and vomiting  Genitourinary: Negative for difficulty urinating, dysuria and hematuria  Musculoskeletal: Negative for arthralgias, gait problem and myalgias  Skin: Negative for rash and wound  Neurological: Positive for dizziness and light-headedness  Negative for weakness, numbness and headaches  Psychiatric/Behavioral: Negative for confusion  The patient is not nervous/anxious  Past Medical and Surgical History:     Past Medical History:   Diagnosis Date    Lymphoma [facility])        Past Surgical History:   Procedure Laterality Date    GALLBLADDER SURGERY         Meds/Allergies:    Prior to Admission medications    Not on File     I have reviewed home medications with patient personally      Allergies: No Known Allergies    Social History:     Marital Status: /Civil Union   Occupation:    Patient Pre-hospital Living Situation: home residence is [place], [place]  Patient Pre-hospital Level of Mobility: Independent   Patient Pre-hospital Diet Restrictions: None  Substance Use History:   Social History     Substance and Sexual Activity   Alcohol Use Not Currently     Social History     Tobacco Use   Smoking Status Former Smoker   Smokeless Tobacco Never Used     Social History     Substance and Sexual Activity   Drug Use Not Currently       Family History:    History reviewed  No pertinent family history  Physical Exam:     Vitals:   Blood Pressure: 131/81 (03/04/20 2045)  Pulse: 102 (03/04/20 2045)  Temperature: 98 6 °F (37 °C) (03/04/20 2045)  Temp Source: Tympanic (03/04/20 2045)  Respirations: 13 (03/04/20 2045)  Height: 6' (182 9 cm) (03/04/20 1944)  Weight - Scale: 120 kg (264 lb) (03/04/20 1620)  SpO2: 93 % (03/04/20 2045)    Physical Exam   Constitutional: He is oriented to person, place, and time  He appears well-developed and well-nourished  No distress  Pleasant, obese gentleman resting in bed on room air   HENT:   Head: Normocephalic  Right Ear: There is tenderness  No drainage or swelling  Tympanic membrane is erythematous  Left Ear: Tympanic membrane is not erythematous  Mouth/Throat: Oropharynx is clear and moist    Right tympanic membrane is erythematous, vascular, and appears thick/yellow  Tender to touch  Eyes: Pupils are equal, round, and reactive to light  Conjunctivae and EOM are normal  Right eye exhibits no discharge  Left eye exhibits no discharge  No scleral icterus  Neck: Normal range of motion  Neck supple  No JVD present  Cardiovascular: Normal rate, regular rhythm, normal heart sounds and intact distal pulses  Pulmonary/Chest: Effort normal  No tachypnea  No respiratory distress  He has decreased breath sounds in the right lower field and the left lower field  He has no wheezes  He has no rhonchi  He has no rales  Abdominal: Soft  Bowel sounds are normal  He exhibits no distension  There is no tenderness  There is no rebound and no guarding  Musculoskeletal: Normal range of motion  He exhibits edema (trace BLE)  He exhibits no tenderness     Neurological: He is alert and oriented to person, place, and time  He has normal reflexes  No cranial nerve deficit  Skin: Skin is warm and dry  No rash noted  He is not diaphoretic  No erythema  Psychiatric: He has a normal mood and affect  His behavior is normal  Judgment and thought content normal    Nursing note and vitals reviewed  Additional Data:     Lab Results: I have personally reviewed pertinent reports  Results from last 7 days   Lab Units 03/04/20  1651   WBC Thousand/uL 16 73*   HEMOGLOBIN g/dL 15 0   HEMATOCRIT % 45 0   PLATELETS Thousands/uL 153   NEUTROS PCT % 89*   LYMPHS PCT % 5*   MONOS PCT % 5   EOS PCT % 0     Results from last 7 days   Lab Units 03/04/20  1651   SODIUM mmol/L 137   POTASSIUM mmol/L 4 4   CHLORIDE mmol/L 100   CO2 mmol/L 29   BUN mg/dL 10   CREATININE mg/dL 1 01   ANION GAP mmol/L 8   CALCIUM mg/dL 9 1   ALBUMIN g/dL 3 9   TOTAL BILIRUBIN mg/dL 0 90   ALK PHOS U/L 83   ALT U/L 23   AST U/L 11   GLUCOSE RANDOM mg/dL 144*     Results from last 7 days   Lab Units 03/04/20  1651   INR  1 07                   Imaging: I have personally reviewed pertinent reports  CTA head and neck with and without contrast   Final Result by Josue Solomon MD (03/04 1914)         1  No evidence of acute intracranial hemorrhage  2  No evidence of hemodynamic significant stenosis, aneurysm or dissection  3  Suspect right otomastoiditis  4  Pansinusitis  Workstation performed: [id number]             EKG, Pathology, and Other Studies Reviewed on Admission:   · EKG: Sinus tachycardia with  bpm    Allscripts / Epic Records Reviewed: No - none available    ** Please Note: This note has been constructed using a voice recognition system   **

## 2020-03-05 NOTE — ASSESSMENT & PLAN NOTE
Complains of excessive postnasal drip for years  Follows outpatient ENT who has him on Sudafed, Zyrtec, Flonase and nasal washes    Has a follow-up with ENT on 3/13/2020  · Continue Flonase, Claritin, and Sudafed as needed  · Trial Astelin nasal spray  · ENT consultation, recommendations appreciated

## 2020-03-05 NOTE — ASSESSMENT & PLAN NOTE
Likely due to acute ostitis media, associated with nausea, vomiting, and gait instability x1 day  Was given Valium 5 mg po x1 in ED with improvement in symptoms  · Meclizine 25 mg q8h  · Valium 5 mg po PRN  · PT eval

## 2020-03-05 NOTE — ASSESSMENT & PLAN NOTE
POA, as evidence by tachycardia and leukocytosis in the setting of otomastoiditis   · Follow-up blood cultures  · Monitor WBC- downtrending  · Patient remains afebrile

## 2020-03-05 NOTE — PLAN OF CARE
Problem: Potential for Falls  Goal: Patient will remain free of falls  Description  INTERVENTIONS:  - Assess patient frequently for physical needs  -  Identify cognitive and physical deficits and behaviors that affect risk of falls    -  [place] fall precautions as indicated by assessment   - Educate patient/family on patient safety including physical limitations  - Instruct patient to call for assistance with activity based on assessment  - Modify environment to reduce risk of injury  - Consider OT/PT consult to assist with strengthening/mobility  Outcome: Progressing     Problem: PAIN - ADULT  Goal: Verbalizes/displays adequate comfort level or baseline comfort level  Description  Interventions:  - Encourage patient to monitor pain and request assistance  - Assess pain using appropriate pain scale  - Administer analgesics based on type and severity of pain and evaluate response  - Implement non-pharmacological measures as appropriate and evaluate response  - Consider cultural and social influences on pain and pain management  - Notify physician/advanced practitioner if interventions unsuccessful or patient reports new pain  Outcome: Progressing     Problem: INFECTION - ADULT  Goal: Absence or prevention of progression during hospitalization  Description  INTERVENTIONS:  - Assess and monitor for signs and symptoms of infection  - Monitor lab/diagnostic results  - Monitor all insertion sites, i e  indwelling lines, tubes, and drains  - Monitor endotracheal if appropriate and nasal secretions for changes in amount and color  - [place] appropriate cooling/warming therapies per order  - Administer medications as ordered  - Instruct and encourage patient and family to use good hand hygiene technique  - Identify and instruct in appropriate isolation precautions for identified infection/condition  Outcome: Progressing     Problem: SAFETY ADULT  Goal: Patient will remain free of falls  Description  INTERVENTIONS:  - Assess patient frequently for physical needs  -  Identify cognitive and physical deficits and behaviors that affect risk of falls  -  [place] fall precautions as indicated by assessment   - Educate patient/family on patient safety including physical limitations  - Instruct patient to call for assistance with activity based on assessment  - Modify environment to reduce risk of injury  - Consider OT/PT consult to assist with strengthening/mobility  Outcome: Progressing     Problem: DISCHARGE PLANNING  Goal: Discharge to home or other facility with appropriate resources  Description  INTERVENTIONS:  - Identify barriers to discharge w/patient and caregiver  - Arrange for needed discharge resources and transportation as appropriate  - Identify discharge learning needs (meds, wound care, etc )  - Arrange for interpretive services to assist at discharge as needed  - Refer to Case Management Department for coordinating discharge planning if the patient needs post-hospital services based on physician/advanced practitioner order or complex needs related to functional status, cognitive ability, or social support system  Outcome: Progressing     Problem: Knowledge Deficit  Goal: Patient/family/caregiver demonstrates understanding of disease process, treatment plan, medications, and discharge instructions  Description  Complete learning assessment and assess knowledge base    Interventions:  - Provide teaching at level of understanding  - Provide teaching via preferred learning methods  Outcome: Progressing     Problem: MUSCULOSKELETAL - ADULT  Goal: Maintain or return mobility to safest level of function  Description  INTERVENTIONS:  - Assess patient's ability to carry out ADLs; assess patient's baseline for ADL function and identify physical deficits which impact ability to perform ADLs (bathing, care of mouth/teeth, toileting, grooming, dressing, etc )  - Assess/evaluate cause of self-care deficits   - Assess range of motion  - Assess patient's mobility  - Assess patient's need for assistive devices and provide as appropriate  - Encourage maximum independence but intervene and supervise when necessary  - Involve family in performance of ADLs  - Assess for home care needs following discharge   - Consider OT consult to assist with ADL evaluation and planning for discharge  - Provide patient education as appropriate  Outcome: Progressing

## 2020-03-05 NOTE — ASSESSMENT & PLAN NOTE
Hst of post nasal drip and right middle ear effusion being treated by ENT with antihistamines and decongestants   Now with worsening pain, vertigo, nausea, and vomiting  CTA head and neck: 'Suspect right otomastoiditis   Pansinusitis '  ER provider spoke with on call ENT provider who will see the patient tomorrow  Given Zosyn and Vancomycin IV x1 in ED   · continue IV Cefazolin + Flagyl  · Consult ENT, recs appreciated  · Continue home antihistamines and decongestants  · Pain control: change regimen to Tylenol 975mg q8hr, toradol 15mg q6hr, oxycodone 5mg q4hr PRN

## 2020-03-05 NOTE — ASSESSMENT & PLAN NOTE
POA, as evidence by tachycardia and leukocytosis in the setting of otomastoiditis   · Continue IV antibiotics as above  · Follow-up blood cultures  · Monitor CBC  · Monitor for worsening signs of infection

## 2020-03-05 NOTE — ASSESSMENT & PLAN NOTE
Likely due to acute ostitis media   Patient developed vertigo associated with nausea, vomiting, and gait instability this afternoon  Was given Valium 5 mg po x1 in ED with improvement in symptoms  · Meclizine 25 mg q8h  · Valium 5 mg po PRN  · PT eval  · Hold off on IV hydration as patient is eating and drinking well

## 2020-03-05 NOTE — ASSESSMENT & PLAN NOTE
Complains of excessive postnasal drip for years  Follows outpatient ENT who has him on Sudafed, Zyrtec, Flonase and nasal washes    Has a follow-up with ENT on 3/13/2020  · Continue Flonase, Claritin, and Sudafed as needed  · Trial Astelin nasal spray  · ENT consulted, recs appreciated

## 2020-03-06 LAB
ANION GAP SERPL CALCULATED.3IONS-SCNC: 7 MMOL/L (ref 4–13)
BASOPHILS # BLD AUTO: 0.02 THOUSANDS/ΜL (ref 0–0.1)
BASOPHILS NFR BLD AUTO: 0 % (ref 0–1)
BUN SERPL-MCNC: 15 MG/DL (ref 5–25)
CALCIUM SERPL-MCNC: 8.3 MG/DL (ref 8.3–10.1)
CHLORIDE SERPL-SCNC: 103 MMOL/L (ref 100–108)
CO2 SERPL-SCNC: 26 MMOL/L (ref 21–32)
CREAT SERPL-MCNC: 1.02 MG/DL (ref 0.6–1.3)
EOSINOPHIL # BLD AUTO: 0.02 THOUSAND/ΜL (ref 0–0.61)
EOSINOPHIL NFR BLD AUTO: 0 % (ref 0–6)
ERYTHROCYTE [DISTWIDTH] IN BLOOD BY AUTOMATED COUNT: 12.4 % (ref 11.6–15.1)
GFR SERPL CREATININE-BSD FRML MDRD: 80 ML/MIN/1.73SQ M
GLUCOSE SERPL-MCNC: 101 MG/DL (ref 65–140)
HCT VFR BLD AUTO: 40.8 % (ref 36.5–49.3)
HGB BLD-MCNC: 13.2 G/DL (ref 12–17)
IMM GRANULOCYTES # BLD AUTO: 0.03 THOUSAND/UL (ref 0–0.2)
IMM GRANULOCYTES NFR BLD AUTO: 0 % (ref 0–2)
LYMPHOCYTES # BLD AUTO: 1.6 THOUSANDS/ΜL (ref 0.6–4.47)
LYMPHOCYTES NFR BLD AUTO: 16 % (ref 14–44)
MCH RBC QN AUTO: 30.2 PG (ref 26.8–34.3)
MCHC RBC AUTO-ENTMCNC: 32.4 G/DL (ref 31.4–37.4)
MCV RBC AUTO: 93 FL (ref 82–98)
MONOCYTES # BLD AUTO: 0.79 THOUSAND/ΜL (ref 0.17–1.22)
MONOCYTES NFR BLD AUTO: 8 % (ref 4–12)
NEUTROPHILS # BLD AUTO: 7.7 THOUSANDS/ΜL (ref 1.85–7.62)
NEUTS SEG NFR BLD AUTO: 76 % (ref 43–75)
NRBC BLD AUTO-RTO: 0 /100 WBCS
PLATELET # BLD AUTO: 158 THOUSANDS/UL (ref 149–390)
PMV BLD AUTO: 8.6 FL (ref 8.9–12.7)
POTASSIUM SERPL-SCNC: 3.7 MMOL/L (ref 3.5–5.3)
RBC # BLD AUTO: 4.37 MILLION/UL (ref 3.88–5.62)
SODIUM SERPL-SCNC: 136 MMOL/L (ref 136–145)
WBC # BLD AUTO: 10.16 THOUSAND/UL (ref 4.31–10.16)

## 2020-03-06 PROCEDURE — 85025 COMPLETE CBC W/AUTO DIFF WBC: CPT | Performed by: STUDENT IN AN ORGANIZED HEALTH CARE EDUCATION/TRAINING PROGRAM

## 2020-03-06 PROCEDURE — 69420 INCISION OF EARDRUM: CPT | Performed by: OTOLARYNGOLOGY

## 2020-03-06 PROCEDURE — 99232 SBSQ HOSP IP/OBS MODERATE 35: CPT | Performed by: STUDENT IN AN ORGANIZED HEALTH CARE EDUCATION/TRAINING PROGRAM

## 2020-03-06 PROCEDURE — 09957ZZ DRAINAGE OF RIGHT MIDDLE EAR, VIA NATURAL OR ARTIFICIAL OPENING: ICD-10-PCS | Performed by: OTOLARYNGOLOGY

## 2020-03-06 PROCEDURE — 80048 BASIC METABOLIC PNL TOTAL CA: CPT | Performed by: STUDENT IN AN ORGANIZED HEALTH CARE EDUCATION/TRAINING PROGRAM

## 2020-03-06 PROCEDURE — 99231 SBSQ HOSP IP/OBS SF/LOW 25: CPT | Performed by: OTOLARYNGOLOGY

## 2020-03-06 RX ORDER — CIPROFLOXACIN AND DEXAMETHASONE 3; 1 MG/ML; MG/ML
4 SUSPENSION/ DROPS AURICULAR (OTIC) 2 TIMES DAILY
Status: DISCONTINUED | OUTPATIENT
Start: 2020-03-07 | End: 2020-03-07

## 2020-03-06 RX ADMIN — AZELASTINE HYDROCHLORIDE 1 SPRAY: 137 SPRAY, METERED NASAL at 08:15

## 2020-03-06 RX ADMIN — OXYCODONE HYDROCHLORIDE 5 MG: 5 TABLET ORAL at 13:43

## 2020-03-06 RX ADMIN — KETOROLAC TROMETHAMINE 15 MG: 30 INJECTION, SOLUTION INTRAMUSCULAR at 05:25

## 2020-03-06 RX ADMIN — CEFAZOLIN SODIUM 2000 MG: 2 SOLUTION INTRAVENOUS at 22:52

## 2020-03-06 RX ADMIN — AZELASTINE HYDROCHLORIDE 1 SPRAY: 137 SPRAY, METERED NASAL at 17:00

## 2020-03-06 RX ADMIN — FLUTICASONE PROPIONATE 1 SPRAY: 50 SPRAY, METERED NASAL at 08:15

## 2020-03-06 RX ADMIN — LORATADINE 10 MG: 10 TABLET ORAL at 08:04

## 2020-03-06 RX ADMIN — METRONIDAZOLE 500 MG: 500 INJECTION, SOLUTION INTRAVENOUS at 01:38

## 2020-03-06 RX ADMIN — CEFAZOLIN SODIUM 2000 MG: 2 SOLUTION INTRAVENOUS at 08:03

## 2020-03-06 RX ADMIN — OXYCODONE HYDROCHLORIDE 5 MG: 5 TABLET ORAL at 21:50

## 2020-03-06 RX ADMIN — CEFAZOLIN SODIUM 2000 MG: 2 SOLUTION INTRAVENOUS at 15:45

## 2020-03-06 RX ADMIN — ACETAMINOPHEN 975 MG: 325 TABLET, FILM COATED ORAL at 17:00

## 2020-03-06 RX ADMIN — ACYCLOVIR 400 MG: 200 CAPSULE ORAL at 08:14

## 2020-03-06 RX ADMIN — KETOROLAC TROMETHAMINE 15 MG: 30 INJECTION, SOLUTION INTRAMUSCULAR at 00:48

## 2020-03-06 RX ADMIN — ACETAMINOPHEN 975 MG: 325 TABLET, FILM COATED ORAL at 05:25

## 2020-03-06 RX ADMIN — ACETAMINOPHEN 975 MG: 325 TABLET, FILM COATED ORAL at 11:44

## 2020-03-06 RX ADMIN — ACYCLOVIR 400 MG: 200 CAPSULE ORAL at 21:42

## 2020-03-06 RX ADMIN — METRONIDAZOLE 500 MG: 500 INJECTION, SOLUTION INTRAVENOUS at 08:30

## 2020-03-06 RX ADMIN — MORPHINE SULFATE 2 MG: 2 INJECTION, SOLUTION INTRAMUSCULAR; INTRAVENOUS at 23:00

## 2020-03-06 RX ADMIN — MORPHINE SULFATE 2 MG: 2 INJECTION, SOLUTION INTRAMUSCULAR; INTRAVENOUS at 15:45

## 2020-03-06 RX ADMIN — ONDANSETRON 4 MG: 2 INJECTION INTRAMUSCULAR; INTRAVENOUS at 23:05

## 2020-03-06 RX ADMIN — ACETAMINOPHEN 975 MG: 325 TABLET, FILM COATED ORAL at 23:46

## 2020-03-06 RX ADMIN — PROMETHAZINE HYDROCHLORIDE 12.5 MG: 25 INJECTION INTRAMUSCULAR; INTRAVENOUS at 00:00

## 2020-03-06 RX ADMIN — ONDANSETRON 4 MG: 2 INJECTION INTRAMUSCULAR; INTRAVENOUS at 17:05

## 2020-03-06 RX ADMIN — METRONIDAZOLE 500 MG: 500 INJECTION, SOLUTION INTRAVENOUS at 16:52

## 2020-03-06 RX ADMIN — ONDANSETRON 4 MG: 2 INJECTION INTRAMUSCULAR; INTRAVENOUS at 07:59

## 2020-03-06 RX ADMIN — METRONIDAZOLE 500 MG: 500 INJECTION, SOLUTION INTRAVENOUS at 23:47

## 2020-03-06 RX ADMIN — ENOXAPARIN SODIUM 40 MG: 40 INJECTION SUBCUTANEOUS at 08:03

## 2020-03-06 NOTE — UTILIZATION REVIEW
Continued Stay Review    Date: 3-5-20              Current Patient Class: inpatient  Current Level of Care: medical surgical     HPI:59 y o  male initially admitted on mastoiditis on right, sepsis, vertigo, post nasal drip, history b cell lymphoma ( remission since 2015 on acyclovir)    Assessment/Plan:   Plan to continue iv antibiotics and follow up on blood cultures drawn in ed,  Monitor cbc, trend vital signs,  Obtain therapy evaluations of dizziness  Trial astelin nasal spray,  ENT consultation, continue claritin, sudafed and flonase, continue acyclovir and follow up outpatient with Oncologist, po pain management  Temp  Max today is 99 5      Pertinent Labs/Diagnostic Results:   Results from last 7 days   Lab Units 03/05/20  0524 03/04/20  1651   WBC Thousand/uL 12 80* 16 73*   HEMOGLOBIN g/dL 14 1 15 0   HEMATOCRIT % 42 9 45 0   PLATELETS Thousands/uL 159 153   NEUTROS ABS Thousands/µL 10 55* 14 98*         Results from last 7 days   Lab Units 03/05/20  0524 03/04/20  1651   SODIUM mmol/L 137 137   POTASSIUM mmol/L 3 6 4 4   CHLORIDE mmol/L 101 100   CO2 mmol/L 28 29   ANION GAP mmol/L 8 8   BUN mg/dL 13 10   CREATININE mg/dL 1 09 1 01   EGFR ml/min/1 73sq m 74 81   CALCIUM mg/dL 8 5 9 1   MAGNESIUM mg/dL 2 1  --    PHOSPHORUS mg/dL 2 5*  --      Results from last 7 days   Lab Units 03/05/20  0524 03/04/20  1651   AST U/L 11 11   ALT U/L 18 23   ALK PHOS U/L 70 83   TOTAL PROTEIN g/dL 6 3* 7 2   ALBUMIN g/dL 3 3* 3 9   TOTAL BILIRUBIN mg/dL 0 80 0 90         Results from last 7 days   Lab Units 03/05/20  0524 03/04/20  1651   GLUCOSE RANDOM mg/dL 101 144*       Results from last 7 days   Lab Units 03/04/20  1651   TROPONIN I ng/mL <0 02         Results from last 7 days   Lab Units 03/04/20  1651   PROTIME seconds 11 5   INR  1 07   PTT seconds 40*       Results from last 7 days   Lab Units 03/04/20 2001   BLOOD CULTURE  Received in Microbiology Lab  Culture in Progress  Received in Microbiology Lab  Culture in Progress  Vital Signs:    Pain  10/10    03/05/20 2132  98 8 °F (37 1 °C)  73  16  128/84  map  93 %  None (Room air)   03/05/20 1645  98 7 °F (37 1 °C)  84  19  120/75    95 %  None (Room air)   03/05/20 0754  97 8 °F (36 6 °C)  92  16  121/74  92  92 %  None (Room air)   03/05/20 0323  99 3 °F (37 4 °C)  97  18  137/97    93 %  None (Room air)   03/05/20 0025  99 5 °F (37 5 °C)  95  18  118/70    94 %  None (Room air)         Medications:   Scheduled Medications:    Medications:  acetaminophen 975 mg Oral Q6H [facility]   acyclovir 400 mg Oral Q12H KRISTEN   azelastine 1 spray Each Nare BID   cefazolin 2,000 mg Intravenous Q8H   enoxaparin 40 mg Subcutaneous Q24H KRISTEN   fluticasone 1 spray Each Nare Daily   loratadine 10 mg Oral Daily   metroNIDAZOLE 500 mg Intravenous Q8H     Continuous IV Infusions:     PRN Meds:    aluminum-magnesium hydroxide-simethicone 30 mL Oral Q6H PRN   diazepam 5 mg Oral Q6H PRN   morphine injection 2 mg Intravenous Q4H PRN   ondansetron 4 mg Intravenous Q6H PRN   oxyCODONE 5 mg Oral Q4H PRN   pseudoephedrine 60 mg Oral Q6H PRN       Discharge Plan: to be determined     Network Utilization Review Department  [email or web address]  ATTENTION: Please call with any questions or concerns to [phone] and carefully listen to the prompts so that you are directed to the right person  All voicemails are confidential   Marilyn Tinoco all requests for admission clinical reviews, approved or denied determinations and any other requests to dedicated fax number below belonging to the campus where the patient is receiving treatment   List of dedicated fax numbers for the Facilities:  [address] DENIALS (Administrative/Medical Necessity) [phone]   [address] (Maternity/NICU/Pediatrics) [phone]   Johnny Sutton [address] [facility] [phone]   Mayra Mckee [phone]     [facility] [address] [facility] [phone]   Hoda Arndt [phone] [address] [facility]  [phone]   [address] [place] [address] [address] [facility] [phone]

## 2020-03-06 NOTE — ASSESSMENT & PLAN NOTE
POA, as evidence by tachycardia and leukocytosis in the setting of otomastoiditis   · blood cultures- negative at 72 hours  · Monitor WBC- normalized  · Patient afebrile

## 2020-03-06 NOTE — ASSESSMENT & PLAN NOTE
Complains of excessive postnasal drip for years  Follows outpatient ENT who has him on Sudafed, Zyrtec, Flonase and nasal washes    Has a follow-up with ENT on 3/13/2020  · Continue Flonase, Claritin, and Sudafed as needed  · Also started trial of Astelin nasal spray  · ENT recs appreciated

## 2020-03-06 NOTE — UTILIZATION REVIEW
Initial Clinical Review    Admission: Date/Time/Statement: Admission Orders (From admission, onward)     Ordered        03/04/20 1953  Inpatient Admission (expected length of stay for this patient Order details is greater than two midnights)  Once                   Orders Placed This Encounter   Procedures    Inpatient Admission (expected length of stay for this patient Order details is greater than two midnights)     Standing Status:   Standing     Number of Occurrences:   1     Order Specific Question:   Admitting Physician     Answer:   Oniel Nevarez     Order Specific Question:   Level of Care     Answer:   Med Surg [16]     Order Specific Question:   Estimated length of stay     Answer:   More than 2 Midnights     Order Specific Question:   Certification     Answer:   I certify that inpatient services are medically necessary for this patient for a duration of greater than two midnights  See H&P and MD Progress Notes for additional information about the patient's course of treatment  ED Arrival Information     Expected Arrival Acuity Means of Arrival Escorted By Service Admission Type    - 3/4/2020 15:49 Urgent Walk-In Self General Medicine Urgent    Arrival Complaint    Ear Pain        Chief Complaint   Patient presents with    Earache     c/o R ear discomfort with fluid in the ear for 6 weeks has seen ent and has follow up, balance getting worse, today had headache and started vomiting     Assessment/Plan    48year old male presents to ed from [facility] for evaluation of right ear fullness and dizziness x6 weeks  On arrival he reports that he is a  from [place] and has been evaluated in Urgent care for these symptoms and had ear wax removed  He was instructed that if there was not improvement to be evaluated by  ENT  The ENT placed him on an antihistamine for fluid in the middle ear and did a hearing test   Symptoms continued to worsen    He describes spinning dizziness with nausea and vomiting and pain 10/10  [place]  Physical examination is significant for Tympanic membrane  Redness, vascular around edges,  Appears yellowed and thickened, tenderness at right ear / mastoid area, tachycardia, hypertension, wbc 16 73  Ct chows right otomastoiditis and pansinusitis  Ekg shows sinus tachycardia  PMHX : lymphoma Treated in ed with iv zofran x2, po valium x1, iv toradol x1 and iv piperacillin  Admit to inpatient medical surgical for acute mastoiditis on the right side  ED Triage Vitals [03/04/20 1620]   98 8 °F (37 1 °C) (!) 108 20 (!) 189/105 95 %      Tympanic Monitor         Worst Possible Pain       03/04/20 120 kg (264 lb)     Additional Vital Signs:     03/04/20 2045  98 6 °F (37 °C)  102  13  131/81    93 %  None (Room air)   03/04/20 2000    101  17  127/67    92 %  None (Room air)   03/04/20 1913    103  14  128/80    94 %  None (Room air)   03/04/20 1815    105  18      97 %     03/04/20 1800    105  19      90 %     03/04/20 1745    104  16      91 %       Pain 7-8/10           Pertinent Labs/Diagnostic Test Results:       Collection Time Result Time Vent R Atrial R CA Int  QRSD Int  QT Int  QTC Int  P Axis QRS Axis T Wave Ax    03/04/20 16:45:37 03/05/20 09:44:11 107 107 162 84 344 459 54 32 31           Sinus tachycardia with Premature atrial complexes  Low voltage QRS  Cannot rule out Inferior infarct , age undetermined  Cannot rule out Anterior infarct , age undetermined  Abnormal ECG  No previous ECGs available      CTA head and neck with and without contrast   Final (03/04 1914)         1  No evidence of acute intracranial hemorrhage  2  No evidence of hemodynamic significant stenosis, aneurysm or dissection  3  Suspect right otomastoiditis  4  Pansinusitis          Results from last 7 days   Lab Units 03/04/20  1651   WBC Thousand/uL 16 73*   HEMOGLOBIN g/dL 15 0   HEMATOCRIT % 45 0   PLATELETS Thousands/uL 153   NEUTROS ABS Thousands/µL 14 98*         Results from last 7 days   Lab Units 03/04/20  1651   SODIUM mmol/L 137   POTASSIUM mmol/L 4 4   CHLORIDE mmol/L 100   CO2 mmol/L 29   ANION GAP mmol/L 8   BUN mg/dL 10   CREATININE mg/dL 1 01   EGFR ml/min/1 73sq m 81   CALCIUM mg/dL 9 1   MAGNESIUM mg/dL  --    PHOSPHORUS mg/dL  --      Results from last 7 days   Lab Units 03/04/20  1651   AST U/L 11   ALT U/L 23   ALK PHOS U/L 83   TOTAL PROTEIN g/dL 7 2   ALBUMIN g/dL 3 9   TOTAL BILIRUBIN mg/dL 0 90         Results from last 7 days   Lab Units 03/04/20  1651   GLUCOSE RANDOM mg/dL 144*       Results from last 7 days   Lab Units 03/04/20  1651   TROPONIN I ng/mL <0 02         Results from last 7 days   Lab Units 03/04/20  1651   PROTIME seconds 11 5   INR  1 07   PTT seconds 40*       Results from last 7 days   Lab Units 03/04/20 2001   BLOOD CULTURE  Received in Microbiology Lab  Culture in Progress  Received in Microbiology Lab  Culture in Progress                 ED Treatment:   Medication Administration from 03/04/2020 1549 to 03/04/2020 2107       Date/Time Order Dose Route Action     03/04/2020 1655 diazepam (VALIUM) tablet 5 mg 5 mg Oral Given     03/04/2020 1655 ondansetron (ZOFRAN) injection 4 mg 4 mg Intravenous Given     03/04/2020 1811 ketorolac (TORADOL) injection 30 mg 30 mg Intravenous Given     03/04/2020 2017 piperacillin-tazobactam (ZOSYN) IVPB 4 5 g 4 5 g Intravenous New Bag     03/04/2020 2008 ondansetron (ZOFRAN) injection 4 mg 4 mg Intravenous Given        Past Medical History:   Diagnosis    Lymphoma ([facility] [address] )     Present on Admission:     Acute mastoiditis of right side   Vertigo   Post-nasal drip   History of diffuse large B cell lymphoma (HCC)   Sepsis ([facility] [address] )      Admitting Diagnosis:   Ear pain [H92 09]  Otitis media [H66 90]  Vertigo [R42]  Blocked ear [H93 8X9]  Mastoiditis of right side [H70 91]     Age/Sex: 61 y o  male     Admission Orders:    Scheduled Medications:    Medications:  acetaminophen 975 mg Oral Q6H [address]   acyclovir 400 mg Oral Q12H KRISTEN   azelastine 1 spray Each Nare BID   cefazolin 2,000 mg Intravenous Q8H   enoxaparin 40 mg Subcutaneous Q24H KRISTEN   fluticasone 1 spray Each Nare Daily   loratadine 10 mg Oral Daily   metroNIDAZOLE 500 mg Intravenous Q8H     Continuous IV Infusions:     PRN Meds:    aluminum-magnesium hydroxide-simethicone 30 mL Oral Q6H PRN   diazepam 5 mg Oral Q6H PRN   morphine injection 2 mg Intravenous Q4H PRN   ondansetron 4 mg Intravenous Q6H PRN   oxyCODONE 5 mg Oral Q4H PRN   pseudoephedrine 60 mg Oral Q6H PRN       IP CONSULT TO ENT    Network Utilization Review Department  [email or web address]  ATTENTION: Please call with any questions or concerns to [phone] and carefully listen to the prompts so that you are directed to the right person  All voicemails are confidential   Sophie Mayer all requests for admission clinical reviews, approved or denied determinations and any other requests to dedicated fax number below belonging to the campus where the patient is receiving treatment   List of dedicated fax numbers for the Facilities:  [address] DENIALS (Administrative/Medical Necessity) [phone]   [address] (Maternity/NICU/Pediatrics) [phone]   Jairon Padgett [phone]   [facility] [phone]   [facility] [phone]   [facility] [phone]   [address] [phone]   [facility]  [phone]   [address] [facility] [address] [facility] [phone]

## 2020-03-06 NOTE — PROGRESS NOTES
Progress Note Flaquito Lentz 1960, 61 y o  male MRN: [id number]    Unit/Bed#: [address] [place] [address] Encounter: [id number]    Primary Care Provider: No primary care provider on file  Date and time admitted to hospital: 3/4/2020  4:24 PM        Sepsis [facility])  Assessment & Plan  POA, as evidence by tachycardia and leukocytosis in the setting of otomastoiditis   ·  blood cultures- negative at 24 hours  · Monitor WBC- normalized  · Patient afebrile    * Acute mastoiditis of right side  Assessment & Plan  Hx of post nasal drip and right middle ear effusion being treated by ENT with antihistamines and decongestants  Now with worsening pain, vertigo, nausea, and vomiting  CTA head and neck: 'Suspect right otomastoiditis  Pansinusitis '  Given Zosyn and Vancomycin IV x1 in ED   ENT consulted, recs appreciated  · continue IV Cefazolin + Flagyl  · Consulted ENT, recs appreciated  · Continue home antihistamines and decongestants  · Pain control: change regimen to Tylenol 975mg q8hr, toradol 15mg q6hr, oxycodone 5mg q4hr PRN  · Monitor patient clinical course, if no improvement plan to     Post-nasal drip  Assessment & Plan  Complains of excessive postnasal drip for years  Follows outpatient ENT who has him on Sudafed, Zyrtec, Flonase and nasal washes  Has a follow-up with ENT on 3/13/2020  · Continue Flonase, Claritin, and Sudafed as needed  · Trial Astelin nasal spray  · ENT consulted, recs appreciated    Vertigo  Assessment & Plan  Likely due to acute ostitis media, associated with nausea, vomiting, and gait instability x1 day  Was given Valium 5 mg po x1 in ED with improvement in symptoms  · Meclizine 25 mg q8h  · Valium 5 mg po PRN  · PT eval        VTE Pharmacologic Prophylaxis:   Pharmacologic: Enoxaparin (Lovenox)  Mechanical VTE Prophylaxis in Place: Yes    Patient Centered Rounds: I have performed bedside rounds with nursing staff today    Discussions with Specialists or Other Care Team Provider: Yes  Education and Discussions with Family / Patient:Yes  Time Spent for Care: 45 minutes  More than 50% of total time spent on counseling and coordination of care as described above  Current Length of Stay: 2 day(s)  Current Patient Status: Inpatient     Discharge Plan: pending    Code Status: Level 1 - Full Code      Subjective:   Patient cant hear at all from right ear, pain still significant  Objective:     Vitals:   Temp (24hrs), Av 5 °F (36 9 °C), Min:98 °F (36 7 °C), Max:98 8 °F (37 1 °C)    Temp:  [98 °F (36 7 °C)-98 8 °F (37 1 °C)] 98 °F (36 7 °C)  HR:  [69-96] 69  Resp:  [16-18] 18  BP: (128-178)/(82-92) 144/92  SpO2:  [91 %-93 %] 91 %  Body mass index is 35 8 kg/m²  Input and Output Summary (last 24 hours): Intake/Output Summary (Last 24 hours) at 3/6/2020 1744  Last data filed at 3/6/2020 0748  Gross per 24 hour   Intake    Output 625 ml   Net -625 ml        Physical Exam:     Physical Exam   Constitutional: He is oriented to person, place, and time  He appears well-developed  No distress  HENT:   Head: Normocephalic and atraumatic  Right Ear: There is swelling and tenderness  Tympanic membrane is erythematous  Decreased hearing is noted  Decreased hearing from the right ear   Cardiovascular: Normal rate, regular rhythm and normal heart sounds  No murmur heard  Pulmonary/Chest: Effort normal and breath sounds normal  No respiratory distress  He has no wheezes  He has no rales  Abdominal: Soft  Bowel sounds are normal  He exhibits no distension  There is no tenderness  There is no rebound  Neurological: He is alert and oriented to person, place, and time  Skin: Skin is warm and dry  Psychiatric: He has a normal mood and affect  His behavior is normal    Nursing note and vitals reviewed        Additional Data:     Labs:    Results from last 7 days   Lab Units 20  0532 20  0524 20  1651   WBC Thousand/uL 10 16 12 80* 16 73*   HEMOGLOBIN g/dL 13 2 14 1 15 0 HEMATOCRIT % 40 8 42 9 45 0   PLATELETS Thousands/uL 158 159 153   NEUTROS PCT % 76* 83* 89*     Results from last 7 days   Lab Units 03/06/20  0532 03/05/20  0524 03/04/20  1651   SODIUM mmol/L 136 137 137   POTASSIUM mmol/L 3 7 3 6 4 4   CHLORIDE mmol/L 103 101 100   CO2 mmol/L 26 28 29   BUN mg/dL 15 13 10   CREATININE mg/dL 1 02 1 09 1 01   CALCIUM mg/dL 8 3 8 5 9 1   TOTAL BILIRUBIN mg/dL  --  0 80 0 90   ALK PHOS U/L  --  70 83   ALT U/L  --  18 23   AST U/L  --  11 11     Results from last 7 days   Lab Units 03/04/20  1651   INR  1 07     Results from last 7 days   Lab Units 03/04/20  1651   TROPONIN I ng/mL <0 02     No results found for: HGBA1C            * I Have Reviewed All Lab Data Listed Above  * Additional Pertinent Lab Tests Reviewed: All Labs Within Last 24 Hours Reviewed    Imaging:     CTA head and neck with and without contrast   Final Result by Felicia Kirkland MD (03/04 1914)         1  No evidence of acute intracranial hemorrhage  2  No evidence of hemodynamic significant stenosis, aneurysm or dissection  3  Suspect right otomastoiditis  4  Pansinusitis                    Workstation performed: [id number]           Imaging Reports Reviewed by myself    Cultures:   Blood Culture:   Lab Results   Component Value Date    BLOODCX No Growth at 24 hrs  03/04/2020    BLOODCX No Growth at 24 hrs  03/04/2020     Urine Culture: No results found for: URINECX  Sputum Culture: No components found for: SPUTUMCX  Wound Culture: No results found for: WOUNDCULT    Last 24 Hours Medication List:     Current Facility-Administered Medications:  acetaminophen 975 mg Oral Q6H 3630 Pau Fung MD    acyclovir 400 mg Oral Q12H [address] EDITA Ryan    aluminum-magnesium hydroxide-simethicone 30 mL Oral Q6H PRN EDITA Ryan    azelastine 1 spray Each Nare BID EDITA Ryan    cefazolin 2,000 mg Intravenous Q8H EDITA Ryan Last Rate: 2,000 mg (03/06/20 1545)   diazepam 5 mg Oral Q6H PRN EDITA Melvin    enoxaparin 40 mg Subcutaneous Q24H [address] Juany Loza, EDITA    fluticasone 1 spray Each Nare Daily EDITA Melvin    loratadine 10 mg Oral Daily EDITA Melvin    metroNIDAZOLE 500 mg Intravenous Q8H Juany Loza, [address] Last Rate: 500 mg (03/06/20 5250)   morphine injection 2 mg Intravenous Q4H PRN EDITA Melvin    ondansetron 4 mg Intravenous Q6H PRN Juany Loza, EDITA    oxyCODONE 5 mg Oral Q4H PRN Kal Cardozo MD    pseudoephedrine 60 mg Oral Q6H PRN EDITA Melvin         Today, Patient Was Seen By: Kal Cardozo MD    ** Please Note: Dragon 360 Dictation voice to text software may have been used in the creation of this document   **

## 2020-03-06 NOTE — ASSESSMENT & PLAN NOTE
Hx of post nasal drip and right middle ear effusion being treated by ENT with antihistamines and decongestants  Now with worsening pain, vertigo, nausea, and vomiting  CTA head and neck: 'Suspect right otomastoiditis  Pansinusitis '  · ENT consulted, recs appreciated  · Continue home antihistamines and decongestants  · Pain control:  Tylenol 975 mg q 8 hours scheduled,, toradol 15mg q6hr, oxycodone 5mg q4hr PRN  · S/p right myringotomy and aspiration on 3/6/20  · Patient received 4 days of IV Cefazolin + Flagyl, changed to 3 days of IV unasyn, will DC on PO Augmentin for total Abx for 4 weeks  · Discussed case with ENT, recommend MRI of head as patient continues to have disproportionate pain and dizziness postprocedure  · MRI ordered, pending    · Patient will need ENT follow up after DC, which he will do in [place]

## 2020-03-06 NOTE — CONSULTS
Consultation - ENT   Kia Loo 61 y o  male MRN: [id number]  Unit/Bed#: [address] [place] [address] Encounter: [id number]      Assessment/Plan     Assessment:  Right mucoid OM, without clinical mastoiditis  On CTA, Right mastoid air cells appear opacified, possibly sclerotic  Additionally, Left ethmoid sinusitis, as well as diffuse bilateral maxillary sinusitis, is noted  Plan:  Discussed with Dr Richmond Chatman  Continue current cefazolin  Increase Flonase to 2 sprays each nostril QD  Recheck in 24-48 hrs, consider bedside Right myringotomy & aspiration  History of Present Illness   Physician Requesting Consult: Sharon Carney MD  Reason for Consult / Principal Problem: Right Otomastoiditis  HPI: Kia Loo is a 61y o  year old male admitted to [place] [address] on 3/04/2020, for Right middle ear effusion with suspected Right mastoiditis  The patient has a significant history of B-cell lymphoma  He states he is a  from [place], was driving [facility] [employer] across the [facility] 3 weeks ago when he developed Right ear fullness, which progressed to impaired hearing and ear pain by the time he reached [place]  Upon returning to [place], [place], he sought care at local ENT, who noted a Right middle ear effusion  The patient states he was prescribed oral antihistamines, decongestants, and Flonase, with consideration for Right M&T if the effusion failed to resolve  The patient continued to travel in the interim, and on 3/02, he developed worsening Right ear pain, followed by onset of severe dizziness and vomiting  He presented to [address] [place] [address] ER, where he was evaluated and CTA of the head and neck was performed, which suggested possible Right otomastoiditis and pansinusitis          Inpatient consult to ENT  Consult performed by: Ivett Sanders PA-C  Consult ordered by: EDITA An          Review of Systems    Historical Information   Past Medical History:   Diagnosis Date    Lymphoma ([facility] [address] )      Past Surgical History:   Procedure Laterality Date    GALLBLADDER SURGERY       Social History   Social History     Substance and Sexual Activity   Alcohol Use Not Currently     Social History     Substance and Sexual Activity   Drug Use Not Currently     Social History     Tobacco Use   Smoking Status Former Smoker   Smokeless Tobacco Never Used     Family History: non-contributory    Meds/Allergies   all current active meds have been reviewed    No Known Allergies    Objective       Intake/Output Summary (Last 24 hours) at 3/6/2020 1155  Last data filed at 3/6/2020 0748  Gross per 24 hour   Intake    Output 625 ml   Net -625 ml       Invasive Devices     Peripheral Intravenous Line            Peripheral IV 03/04/20 Left Antecubital 1 day                Physical Exam  The patient is alert, in bed, in mild discomfort  Sclera moist, EOM's grossly intact, no nystagmus OU  Auricles appear normal   No edema, erythema, pain, or paresthesia along the mastoid processes  EAC's are patent  Right TM is intact, appears hyperemic, with mucoid middle ear effusion  The Left TM is intact, without apparent middle ear effusion  Nares patent, mucosa normal   Oropharynx clear  No palpable cervical lymphadenopathy  Lab Results: I have personally reviewed pertinent lab results      Imaging Studies: I have personally reviewed pertinent films in PACS  EKG, Pathology, and Other Studies:

## 2020-03-07 PROBLEM — A41.9 SEPSIS (HCC): Status: RESOLVED | Noted: 2020-03-04 | Resolved: 2020-03-07

## 2020-03-07 LAB
ANION GAP SERPL CALCULATED.3IONS-SCNC: 7 MMOL/L (ref 4–13)
BASOPHILS # BLD AUTO: 0.01 THOUSANDS/ΜL (ref 0–0.1)
BASOPHILS NFR BLD AUTO: 0 % (ref 0–1)
BUN SERPL-MCNC: 14 MG/DL (ref 5–25)
CALCIUM SERPL-MCNC: 8.4 MG/DL (ref 8.3–10.1)
CHLORIDE SERPL-SCNC: 102 MMOL/L (ref 100–108)
CO2 SERPL-SCNC: 28 MMOL/L (ref 21–32)
CREAT SERPL-MCNC: 0.97 MG/DL (ref 0.6–1.3)
EOSINOPHIL # BLD AUTO: 0.01 THOUSAND/ΜL (ref 0–0.61)
EOSINOPHIL NFR BLD AUTO: 0 % (ref 0–6)
ERYTHROCYTE [DISTWIDTH] IN BLOOD BY AUTOMATED COUNT: 12.4 % (ref 11.6–15.1)
GFR SERPL CREATININE-BSD FRML MDRD: 85 ML/MIN/1.73SQ M
GLUCOSE SERPL-MCNC: 100 MG/DL (ref 65–140)
HCT VFR BLD AUTO: 41.9 % (ref 36.5–49.3)
HGB BLD-MCNC: 13.8 G/DL (ref 12–17)
IMM GRANULOCYTES # BLD AUTO: 0.03 THOUSAND/UL (ref 0–0.2)
IMM GRANULOCYTES NFR BLD AUTO: 0 % (ref 0–2)
LYMPHOCYTES # BLD AUTO: 1.54 THOUSANDS/ΜL (ref 0.6–4.47)
LYMPHOCYTES NFR BLD AUTO: 16 % (ref 14–44)
MAGNESIUM SERPL-MCNC: 2.1 MG/DL (ref 1.6–2.6)
MCH RBC QN AUTO: 30.7 PG (ref 26.8–34.3)
MCHC RBC AUTO-ENTMCNC: 32.9 G/DL (ref 31.4–37.4)
MCV RBC AUTO: 93 FL (ref 82–98)
MONOCYTES # BLD AUTO: 0.62 THOUSAND/ΜL (ref 0.17–1.22)
MONOCYTES NFR BLD AUTO: 6 % (ref 4–12)
NEUTROPHILS # BLD AUTO: 7.43 THOUSANDS/ΜL (ref 1.85–7.62)
NEUTS SEG NFR BLD AUTO: 78 % (ref 43–75)
NRBC BLD AUTO-RTO: 0 /100 WBCS
PLATELET # BLD AUTO: 157 THOUSANDS/UL (ref 149–390)
PMV BLD AUTO: 8.2 FL (ref 8.9–12.7)
POTASSIUM SERPL-SCNC: 4 MMOL/L (ref 3.5–5.3)
RBC # BLD AUTO: 4.5 MILLION/UL (ref 3.88–5.62)
SODIUM SERPL-SCNC: 137 MMOL/L (ref 136–145)
WBC # BLD AUTO: 9.64 THOUSAND/UL (ref 4.31–10.16)

## 2020-03-07 PROCEDURE — 85025 COMPLETE CBC W/AUTO DIFF WBC: CPT | Performed by: STUDENT IN AN ORGANIZED HEALTH CARE EDUCATION/TRAINING PROGRAM

## 2020-03-07 PROCEDURE — 83735 ASSAY OF MAGNESIUM: CPT | Performed by: STUDENT IN AN ORGANIZED HEALTH CARE EDUCATION/TRAINING PROGRAM

## 2020-03-07 PROCEDURE — 99232 SBSQ HOSP IP/OBS MODERATE 35: CPT | Performed by: STUDENT IN AN ORGANIZED HEALTH CARE EDUCATION/TRAINING PROGRAM

## 2020-03-07 PROCEDURE — 80048 BASIC METABOLIC PNL TOTAL CA: CPT | Performed by: STUDENT IN AN ORGANIZED HEALTH CARE EDUCATION/TRAINING PROGRAM

## 2020-03-07 RX ORDER — SACCHAROMYCES BOULARDII 250 MG
250 CAPSULE ORAL 2 TIMES DAILY
Status: DISCONTINUED | OUTPATIENT
Start: 2020-03-07 | End: 2020-03-11 | Stop reason: HOSPADM

## 2020-03-07 RX ORDER — PANTOPRAZOLE SODIUM 40 MG/1
40 TABLET, DELAYED RELEASE ORAL
Status: DISCONTINUED | OUTPATIENT
Start: 2020-03-08 | End: 2020-03-11 | Stop reason: HOSPADM

## 2020-03-07 RX ORDER — METOCLOPRAMIDE HYDROCHLORIDE 5 MG/ML
10 INJECTION INTRAMUSCULAR; INTRAVENOUS EVERY 6 HOURS PRN
Status: DISCONTINUED | OUTPATIENT
Start: 2020-03-07 | End: 2020-03-11 | Stop reason: HOSPADM

## 2020-03-07 RX ADMIN — ACYCLOVIR 400 MG: 200 CAPSULE ORAL at 08:42

## 2020-03-07 RX ADMIN — OXYCODONE HYDROCHLORIDE 5 MG: 5 TABLET ORAL at 17:18

## 2020-03-07 RX ADMIN — CIPROFLOXACIN AND DEXAMETHASONE 4 DROP: 3; 1 SUSPENSION/ DROPS AURICULAR (OTIC) at 08:41

## 2020-03-07 RX ADMIN — ONDANSETRON 4 MG: 2 INJECTION INTRAMUSCULAR; INTRAVENOUS at 08:41

## 2020-03-07 RX ADMIN — LORATADINE 10 MG: 10 TABLET ORAL at 08:42

## 2020-03-07 RX ADMIN — DIAZEPAM 5 MG: 5 TABLET ORAL at 17:24

## 2020-03-07 RX ADMIN — CEFAZOLIN SODIUM 2000 MG: 2 SOLUTION INTRAVENOUS at 07:46

## 2020-03-07 RX ADMIN — Medication 250 MG: at 17:19

## 2020-03-07 RX ADMIN — ACYCLOVIR 400 MG: 200 CAPSULE ORAL at 21:35

## 2020-03-07 RX ADMIN — OXYCODONE HYDROCHLORIDE 5 MG: 5 TABLET ORAL at 06:00

## 2020-03-07 RX ADMIN — SODIUM CHLORIDE 3 G: 9 INJECTION, SOLUTION INTRAVENOUS at 21:27

## 2020-03-07 RX ADMIN — OXYCODONE HYDROCHLORIDE 5 MG: 5 TABLET ORAL at 22:25

## 2020-03-07 RX ADMIN — CEFAZOLIN SODIUM 2000 MG: 2 SOLUTION INTRAVENOUS at 15:07

## 2020-03-07 RX ADMIN — SODIUM CHLORIDE 3 G: 9 INJECTION, SOLUTION INTRAVENOUS at 14:33

## 2020-03-07 RX ADMIN — ONDANSETRON 4 MG: 2 INJECTION INTRAMUSCULAR; INTRAVENOUS at 17:24

## 2020-03-07 RX ADMIN — ACETAMINOPHEN 975 MG: 325 TABLET, FILM COATED ORAL at 17:19

## 2020-03-07 RX ADMIN — METRONIDAZOLE 500 MG: 500 INJECTION, SOLUTION INTRAVENOUS at 08:47

## 2020-03-07 RX ADMIN — MORPHINE SULFATE 2 MG: 2 INJECTION, SOLUTION INTRAMUSCULAR; INTRAVENOUS at 23:51

## 2020-03-07 RX ADMIN — METOCLOPRAMIDE 10 MG: 5 INJECTION, SOLUTION INTRAMUSCULAR; INTRAVENOUS at 10:37

## 2020-03-07 RX ADMIN — ACETAMINOPHEN 975 MG: 325 TABLET, FILM COATED ORAL at 05:58

## 2020-03-07 RX ADMIN — ENOXAPARIN SODIUM 40 MG: 40 INJECTION SUBCUTANEOUS at 08:42

## 2020-03-07 RX ADMIN — AZELASTINE HYDROCHLORIDE 1 SPRAY: 137 SPRAY, METERED NASAL at 17:19

## 2020-03-07 RX ADMIN — FLUTICASONE PROPIONATE 1 SPRAY: 50 SPRAY, METERED NASAL at 08:42

## 2020-03-07 RX ADMIN — ACETAMINOPHEN 975 MG: 325 TABLET, FILM COATED ORAL at 11:40

## 2020-03-07 NOTE — PLAN OF CARE
Problem: Potential for Falls  Goal: Patient will remain free of falls  Description  INTERVENTIONS:  - Assess patient frequently for physical needs  -  Identify cognitive and physical deficits and behaviors that affect risk of falls    -  [place] fall precautions as indicated by assessment   - Educate patient/family on patient safety including physical limitations  - Instruct patient to call for assistance with activity based on assessment  - Modify environment to reduce risk of injury  - Consider OT/PT consult to assist with strengthening/mobility  Outcome: Progressing   Stand-by assitance, call bell, assistance device if needed  Problem: PAIN - ADULT  Goal: Verbalizes/displays adequate comfort level or baseline comfort level  Description  Interventions:  - Encourage patient to monitor pain and request assistance  - Assess pain using appropriate pain scale  - Administer analgesics based on type and severity of pain and evaluate response  - Implement non-pharmacological measures as appropriate and evaluate response  - Consider cultural and social influences on pain and pain management  - Notify physician/advanced practitioner if interventions unsuccessful or patient reports new pain  Outcome: Progressing   Monitor and assess for pain, administer pain medication prn  Problem: INFECTION - ADULT  Goal: Absence or prevention of progression during hospitalization  Description  INTERVENTIONS:  - Assess and monitor for signs and symptoms of infection  - Monitor lab/diagnostic results  - Monitor all insertion sites, i e  indwelling lines, tubes, and drains  - Monitor endotracheal if appropriate and nasal secretions for changes in amount and color  - [place] appropriate cooling/warming therapies per order  - Administer medications as ordered  - Instruct and encourage patient and family to use good hand hygiene technique  - Identify and instruct in appropriate isolation precautions for identified infection/condition  Outcome: Progressing

## 2020-03-07 NOTE — PROGRESS NOTES
Progress Note - Brian Herrera 61 y o  male MRN: [id number]    Unit/Bed#: [address] [place] [address] Encounter: [id number]      Assessment:  Right otitis media with associated otalgia and vertigo, now s/p right myringotomy and aspiration  The CT reveals that the patient has a chronically sclerotic mastoid and decreased middle ear space on the right, not related to his current condition, but rather likely a sequela of childhood ear infections  Plan:  Continue IV abx  Begin Floxin otitic drops  Symptoms should begin resolving  Will continue to follow  Subjective:   Right ear clogged and painful  Objective:     Vitals: Blood pressure 147/94, pulse 73, temperature 98 1 °F (36 7 °C), resp  rate 18, height 6' (1 829 m), weight 120 kg (264 lb), SpO2 92 %  ,Body mass index is 35 8 kg/m²  Intake/Output Summary (Last 24 hours) at 3/6/2020 2244  Last data filed at 3/6/2020 1822  Gross per 24 hour   Intake    Output 1025 ml   Net -1025 ml       Physical Exam:   AD: EAC nl, TM opacified and hyperemic  Procedure: Myringotomy and aspiration (19020)  Indication: Right suppurative otitis media  Surgeon: Estelle Gallo MD  The risks and benefits of the procedure were discussed with the patient, and the patient's consent was obtained  The patient was placed in the left lateral decubitus position  The right ear canal was filled with tetracaine drops  After 15 minutes, the tetracaine was removed  Using the operating otoscope and speculum, the right tympanic membrane was visualized and an anteroinferior myringotomy was performed  Purulent effusion was suctioned from the middle ear  The patient tolerated the procedure well, and there were no complications  Invasive Devices     Peripheral Intravenous Line            Peripheral IV 03/04/20 Left Antecubital 2 days                Lab, Imaging and other studies: I have personally reviewed pertinent reports      VTE Pharmacologic Prophylaxis: Sequential compression device Luis Enrique Antonio   VTE Mechanical Prophylaxis: sequential compression device

## 2020-03-07 NOTE — PROGRESS NOTES
Progress Note Okey Severin 1960, 61 y o  male MRN: [id number]    Unit/Bed#: 2 [place] [address] Encounter: [id number]    Primary Care Provider: No primary care provider on file  Date and time admitted to hospital: 3/4/2020  4:24 PM        Sepsis (HCC)resolved as of 3/7/2020  Assessment & Plan  POA, as evidence by tachycardia and leukocytosis in the setting of otomastoiditis   · blood cultures- negative at 72 hours  · Monitor WBC- normalized  · Patient afebrile    * Acute mastoiditis of right side  Assessment & Plan  Hx of post nasal drip and right middle ear effusion being treated by ENT with antihistamines and decongestants  Now with worsening pain, vertigo, nausea, and vomiting  CTA head and neck: 'Suspect right otomastoiditis  Pansinusitis '  · ENT consulted, recs appreciated  · Continue home antihistamines and decongestants  · Pain control: Tylenol 975mg q8hr, toradol 15mg q6hr, oxycodone 5mg q4hr PRN  · S/p right myringotomy and aspiration on 3/6/20  · Patient received 4 days of IV Cefazolin + Flagyl, changed to IV unasyn, plan to DC on PO Augmentin, total Abx for 4 weeks  · Awaiting ENT re-evaluation for DC    Class 2 obesity due to excess calories without serious comorbidity with body mass index (BMI) of 35 0 to 35 9 in adult  Assessment & Plan  Body mass index is 35 8 kg/m²  Would benefit from weight loss    History of diffuse large B cell lymphoma (HCC)  Assessment & Plan  Has been in remission since 2015  · Continue home medication, Acyclovir 400 mg twice daily  · Outpatient Oncology follow-up    Post-nasal drip  Assessment & Plan  Complains of excessive postnasal drip for years  Follows outpatient ENT who has him on Sudafed, Zyrtec, Flonase and nasal washes    Has a follow-up with ENT on 3/13/2020  · Continue Flonase, Claritin, and Sudafed as needed  · Also started trial of Astelin nasal spray  · ENT recs appreciated    Vertigo  Assessment & Plan  Likely due to acute ostitis media, associated with nausea, vomiting, and gait instability x1 day  Was given Valium 5 mg po x1 in ED with improvement in symptoms  · Meclizine 25 mg q8h  · Valium 5 mg po PRN  · PT eval appreciated          VTE Pharmacologic Prophylaxis:   Pharmacologic: Enoxaparin (Lovenox)  Mechanical VTE Prophylaxis in Place: Yes    Patient Centered Rounds: I have performed bedside rounds with nursing staff today  Discussions with Specialists or Other Care Team Provider: Yes  Education and Discussions with Family / Patient:Yes  Time Spent for Care: 45 minutes  More than 50% of total time spent on counseling and coordination of care as described above  Current Length of Stay: 4 day(s)  Current Patient Status: Inpatient     Discharge Plan: pending    Code Status: Level 1 - Full Code      Subjective:   Patient ear pain improving, hearing is still not back to normal  He is still draining fluid from his ear  Hes still a little dizzy and has not been out of bed much  States he will try to work with PT today  Plans to have his son drive up from [place] to take him back home, 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  Objective:     Vitals:   Temp (24hrs), Av 1 °F (36 7 °C), Min:97 9 °F (36 6 °C), Max:98 3 °F (36 8 °C)    Temp:  [97 9 °F (36 6 °C)-98 3 °F (36 8 °C)] 97 9 °F (36 6 °C)  HR:  [70-80] 72  Resp:  [16-17] 17  BP: (150-185)/() 181/107  SpO2:  [87 %-96 %] 88 %  Body mass index is 35 8 kg/m²  Input and Output Summary (last 24 hours): Intake/Output Summary (Last 24 hours) at 3/8/2020 1224  Last data filed at 3/8/2020 0401  Gross per 24 hour   Intake    Output 800 ml   Net -800 ml        Physical Exam:     Physical Exam   Constitutional: He is oriented to person, place, and time  He appears well-developed  No distress  HENT:   Head: Normocephalic and atraumatic  Cardiovascular: Normal rate, regular rhythm and normal heart sounds  No murmur heard    Pulmonary/Chest: Effort normal and breath sounds normal  No respiratory distress  He has no wheezes  He has no rales  Abdominal: Soft  Bowel sounds are normal  He exhibits no distension  There is no tenderness  There is no rebound  Neurological: He is alert and oriented to person, place, and time  Skin: Skin is warm and dry  Psychiatric: He has a normal mood and affect  His behavior is normal    Nursing note and vitals reviewed  Additional Data:     Labs:    Results from last 7 days   Lab Units 03/08/20  0641 03/07/20  0600 03/06/20  0532   WBC Thousand/uL 9 38 9 64 10 16   HEMOGLOBIN g/dL 14 4 13 8 13 2   HEMATOCRIT % 43 9 41 9 40 8   PLATELETS Thousands/uL 180 157 158   NEUTROS PCT % 76* 78* 76*     Results from last 7 days   Lab Units 03/08/20  0641 03/07/20  0601 03/06/20  0532 03/05/20  0524 03/04/20  1651   SODIUM mmol/L 139 137 136 137 137   POTASSIUM mmol/L 3 7 4 0 3 7 3 6 4 4   CHLORIDE mmol/L 101 102 103 101 100   CO2 mmol/L 31 28 26 28 29   BUN mg/dL 11 14 15 13 10   CREATININE mg/dL 0 94 0 97 1 02 1 09 1 01   CALCIUM mg/dL 8 5 8 4 8 3 8 5 9 1   TOTAL BILIRUBIN mg/dL  --   --   --  0 80 0 90   ALK PHOS U/L  --   --   --  70 83   ALT U/L  --   --   --  18 23   AST U/L  --   --   --  11 11     Results from last 7 days   Lab Units 03/04/20  1651   INR  1 07     Results from last 7 days   Lab Units 03/04/20  1651   TROPONIN I ng/mL <0 02     No results found for: HGBA1C            * I Have Reviewed All Lab Data Listed Above  * Additional Pertinent Lab Tests Reviewed: All Labs Within Last 24 Hours Reviewed    Imaging:     CTA head and neck with and without contrast   Final Result by Irais Chance MD (03/04 1914)         1  No evidence of acute intracranial hemorrhage  2  No evidence of hemodynamic significant stenosis, aneurysm or dissection  3  Suspect right otomastoiditis  4  Pansinusitis                    Workstation performed: [id number]           Imaging Reports Reviewed by myself    Cultures:   Blood Culture:   Lab Results   Component Value Date    BLOODCX No Growth at 48 hrs  03/04/2020    BLOODCX No Growth at 48 hrs  03/04/2020     Urine Culture: No results found for: URINECX  Sputum Culture: No components found for: SPUTUMCX  Wound Culture: No results found for: WOUNDCULT    Last 24 Hours Medication List:     Current Facility-Administered Medications:  acetaminophen 975 mg Oral Q6H 3630 Pau Fung MD    acyclovir 400 mg Oral Q12H [address] Michalene Lakisha, CRNP    aluminum-magnesium hydroxide-simethicone 30 mL Oral Q6H PRN Michalene Lakisha, CRNP    ampicillin-sulbactam 3 g Intravenous Q6H Sharlee Closs, MD Last Rate: 3 g (03/08/20 0533)   azelastine 1 spray Each Nare BID Michalene Lakisha, CRNP    cefazolin 2,000 mg Intravenous Q8H Michalene Lakisha, CRNP Last Rate: 2,000 mg (03/08/20 0725)   diazepam 5 mg Oral Q6H PRN Michalene Lakisha, CRNP    enoxaparin 40 mg Subcutaneous Q24H [address] Michalene Lakisha, CRNP    fluticasone 1 spray Each Nare Daily Michalene Lakisha, CRNP    loratadine 10 mg Oral Daily Michalene Lakisha, CRNP    metoclopramide 10 mg Intravenous Q6H PRN Sharlee Closs, MD    morphine injection 2 mg Intravenous Q4H PRN Michalene Lakisha, CRNP    ondansetron 4 mg Intravenous Q6H PRN Eamonalene Lakisha, CRNP    oxyCODONE 5 mg Oral Q4H PRN Sharlee Closs, MD    pantoprazole 40 mg Oral Early Morning Sharlee Closs, MD    pseudoephedrine 60 mg Oral Q6H PRN Eamonalene Lakisha, YVONNENP    saccharomyces boulardii 250 mg Oral BID Sharlee Closs, MD         Today, Patient Was Seen By: Sharlee Closs, MD    ** Please Note: Dragon 360 Dictation voice to text software may have been used in the creation of this document   **

## 2020-03-07 NOTE — PROGRESS NOTES
Progress Note Sparkle Anchors 1960, 61 y o  male MRN: [id number]    Unit/Bed#: [address] [place] [address] Encounter: [id number]    Primary Care Provider: No primary care provider on file  Date and time admitted to hospital: 3/4/2020  4:24 PM        Sepsis (HCC)resolved as of 3/7/2020  Assessment & Plan  POA, as evidence by tachycardia and leukocytosis in the setting of otomastoiditis   · blood cultures- negative at 24 hours  · Monitor WBC- normalized  · Patient afebrile    * Acute mastoiditis of right side  Assessment & Plan  Hx of post nasal drip and right middle ear effusion being treated by ENT with antihistamines and decongestants  Now with worsening pain, vertigo, nausea, and vomiting  CTA head and neck: 'Suspect right otomastoiditis  Pansinusitis '  · ENT consulted, recs appreciated  · Patient received 4 days of IV Cefazolin + Flagyl, change not to IV unasyn, and if patient continues to be stable can DC on PO Augmentin  · Continue home antihistamines and decongestants  · Pain control: Tylenol 975mg q8hr, toradol 15mg q6hr, oxycodone 5mg q4hr PRN  · S/p right myringotomy and aspiration on 3/6/20  · Per ENT, patient will remain on IV abx today, possible transition to PO in AM if remains stable    Class 2 obesity due to excess calories without serious comorbidity with body mass index (BMI) of 35 0 to 35 9 in adult  Assessment & Plan  Body mass index is 35 8 kg/m²  Would benefit from weight loss    History of diffuse large B cell lymphoma (HCC)  Assessment & Plan  Has been in remission since 2015  · Continue home medication, Acyclovir 400 mg twice daily  · Outpatient Oncology follow-up    Post-nasal drip  Assessment & Plan  Complains of excessive postnasal drip for years  Follows outpatient ENT who has him on Sudafed, Zyrtec, Flonase and nasal washes    Has a follow-up with ENT on 3/13/2020  · Continue Flonase, Claritin, and Sudafed as needed  · Also started trial of Astelin nasal spray  · ENT recs appreciated    Vertigo  Assessment & Plan  Likely due to acute ostitis media, associated with nausea, vomiting, and gait instability x1 day  Was given Valium 5 mg po x1 in ED with improvement in symptoms  · Meclizine 25 mg q8h  · Valium 5 mg po PRN  · PT/OT- patient will need OP balance center  · 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[place], 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 it up and CM will assist patient in organizing transport back once hes ready for DC        VTE Pharmacologic Prophylaxis:   Pharmacologic: Enoxaparin (Lovenox)  Mechanical VTE Prophylaxis in Place: Yes    Patient Centered Rounds: I have performed bedside rounds with nursing staff today  Discussions with Specialists or Other Care Team Provider: Yes  Education and Discussions with Family / Patient:Yes  Time Spent for Care: 45 minutes  More than 50% of total time spent on counseling and coordination of care as described above  Current Length of Stay: 3 day(s)  Current Patient Status: Inpatient     Discharge Plan: pending    Code Status: Level 1 - Full Code      Subjective:   Still feels dizzy but improved  Ear pain is also much better  He has been draining a lot overnight since he has his myringotomy done  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, he is trying to figure out how hes getting home to [employer] when hes discharged, he thinks he might take a bus      Objective:     Vitals:   Temp (24hrs), Av 2 °F (36 8 °C), Min:98 °F (36 7 °C), Max:98 5 °F (36 9 °C)    Temp:  [98 °F (36 7 °C)-98 5 °F (36 9 °C)] 98 3 °F (36 8 °C)  HR:  [69-79] 79  Resp:  [16-18] 16  BP: (140-147)/(85-94) 141/85  SpO2:  [91 %-93 %] 93 %  Body mass index is 35 8 kg/m²  Input and Output Summary (last 24 hours):      Intake/Output Summary (Last 24 hours) at 3/7/2020 0738  Last data filed at 3/7/2020 0602  Gross per 24 hour   Intake 240 ml   Output 1575 ml   Net -1335 ml        Physical Exam:     Physical Exam   Constitutional: He is oriented to person, place, and time  He appears well-developed  No distress  HENT:   Head: Normocephalic and atraumatic  Cardiovascular: Normal rate, regular rhythm and normal heart sounds  No murmur heard  Pulmonary/Chest: Effort normal and breath sounds normal  No respiratory distress  He has no wheezes  He has no rales  Abdominal: Soft  Bowel sounds are normal  He exhibits no distension  There is no tenderness  There is no rebound  Neurological: He is alert and oriented to person, place, and time  Skin: Skin is warm and dry  Psychiatric: He has a normal mood and affect  His behavior is normal    Nursing note and vitals reviewed  Additional Data:     Labs:    Results from last 7 days   Lab Units 03/07/20  0600 03/06/20  0532 03/05/20  0524   WBC Thousand/uL 9 64 10 16 12 80*   HEMOGLOBIN g/dL 13 8 13 2 14 1   HEMATOCRIT % 41 9 40 8 42 9   PLATELETS Thousands/uL 157 158 159   NEUTROS PCT % 78* 76* 83*     Results from last 7 days   Lab Units 03/07/20  0601 03/06/20  0532 03/05/20  0524 03/04/20  1651   SODIUM mmol/L 137 136 137 137   POTASSIUM mmol/L 4 0 3 7 3 6 4 4   CHLORIDE mmol/L 102 103 101 100   CO2 mmol/L 28 26 28 29   BUN mg/dL 14 15 13 10   CREATININE mg/dL 0 97 1 02 1 09 1 01   CALCIUM mg/dL 8 4 8 3 8 5 9 1   TOTAL BILIRUBIN mg/dL  --   --  0 80 0 90   ALK PHOS U/L  --   --  70 83   ALT U/L  --   --  18 23   AST U/L  --   --  11 11     Results from last 7 days   Lab Units 03/04/20  1651   INR  1 07     Results from last 7 days   Lab Units 03/04/20  1651   TROPONIN I ng/mL <0 02     No results found for: HGBA1C            * I Have Reviewed All Lab Data Listed Above  * Additional Pertinent Lab Tests Reviewed: All Labs Within Last 24 Hours Reviewed    Imaging:     CTA head and neck with and without contrast   Final Result by Queen Oren MD (03/04 1914)         1  No evidence of acute intracranial hemorrhage  2  No evidence of hemodynamic significant stenosis, aneurysm or dissection  3  Suspect right otomastoiditis  4  Pansinusitis  Workstation performed: [id number]           Imaging Reports Reviewed by myself    Cultures:   Blood Culture:   Lab Results   Component Value Date    BLOODCX No Growth at 24 hrs  03/04/2020    BLOODCX No Growth at 24 hrs  03/04/2020     Urine Culture: No results found for: URINECX  Sputum Culture: No components found for: SPUTUMCX  Wound Culture: No results found for: WOUNDCULT    Last 24 Hours Medication List:     Current Facility-Administered Medications:  acetaminophen 975 mg Oral Q6H 3630 Pau Fung MD    acyclovir 400 mg Oral Q12H [address] Edrie Filter, CRNP    aluminum-magnesium hydroxide-simethicone 30 mL Oral Q6H PRN Edrie Filter, CRNP    azelastine 1 spray Each Nare BID Edrie Filter, CRNP    cefazolin 2,000 mg Intravenous Q8H Edrie Filter, CRNP Last Rate: 2,000 mg (03/06/20 0122)   ciprofloxacin-dexamethasone 4 drop Right Ear BID Fidencio Blake MD    diazepam 5 mg Oral Q6H PRN Edrie Filter, CRNP    enoxaparin 40 mg Subcutaneous Q24H [address] Edrie Filter, CRNP    fluticasone 1 spray Each Nare Daily Edrie Filter, CRNP    loratadine 10 mg Oral Daily Edrie Filter, CRNP    metroNIDAZOLE 500 mg Intravenous Q8H Edrie Filter, CRNP Last Rate: 500 mg (03/06/20 8009)   morphine injection 2 mg Intravenous Q4H PRN Edrie Filter, CRNP    ondansetron 4 mg Intravenous Q6H PRN Edrie Filter, CRNP    oxyCODONE 5 mg Oral Q4H PRN Merlin Heys, MD    pseudoephedrine 60 mg Oral Q6H PRN Edrie Filter, CRNP         Today, Patient Was Seen By: Merlin Heys, MD    ** Please Note: Dragon 360 Dictation voice to text software may have been used in the creation of this document   **

## 2020-03-08 LAB
ANION GAP SERPL CALCULATED.3IONS-SCNC: 7 MMOL/L (ref 4–13)
BASOPHILS # BLD AUTO: 0.02 THOUSANDS/ΜL (ref 0–0.1)
BASOPHILS NFR BLD AUTO: 0 % (ref 0–1)
BUN SERPL-MCNC: 11 MG/DL (ref 5–25)
CALCIUM SERPL-MCNC: 8.5 MG/DL (ref 8.3–10.1)
CHLORIDE SERPL-SCNC: 101 MMOL/L (ref 100–108)
CO2 SERPL-SCNC: 31 MMOL/L (ref 21–32)
CREAT SERPL-MCNC: 0.94 MG/DL (ref 0.6–1.3)
EOSINOPHIL # BLD AUTO: 0.01 THOUSAND/ΜL (ref 0–0.61)
EOSINOPHIL NFR BLD AUTO: 0 % (ref 0–6)
ERYTHROCYTE [DISTWIDTH] IN BLOOD BY AUTOMATED COUNT: 12.3 % (ref 11.6–15.1)
GFR SERPL CREATININE-BSD FRML MDRD: 88 ML/MIN/1.73SQ M
GLUCOSE SERPL-MCNC: 97 MG/DL (ref 65–140)
HCT VFR BLD AUTO: 43.9 % (ref 36.5–49.3)
HGB BLD-MCNC: 14.4 G/DL (ref 12–17)
IMM GRANULOCYTES # BLD AUTO: 0.03 THOUSAND/UL (ref 0–0.2)
IMM GRANULOCYTES NFR BLD AUTO: 0 % (ref 0–2)
LYMPHOCYTES # BLD AUTO: 1.54 THOUSANDS/ΜL (ref 0.6–4.47)
LYMPHOCYTES NFR BLD AUTO: 16 % (ref 14–44)
MCH RBC QN AUTO: 30.4 PG (ref 26.8–34.3)
MCHC RBC AUTO-ENTMCNC: 32.8 G/DL (ref 31.4–37.4)
MCV RBC AUTO: 93 FL (ref 82–98)
MONOCYTES # BLD AUTO: 0.7 THOUSAND/ΜL (ref 0.17–1.22)
MONOCYTES NFR BLD AUTO: 8 % (ref 4–12)
NEUTROPHILS # BLD AUTO: 7.08 THOUSANDS/ΜL (ref 1.85–7.62)
NEUTS SEG NFR BLD AUTO: 76 % (ref 43–75)
NRBC BLD AUTO-RTO: 0 /100 WBCS
PLATELET # BLD AUTO: 180 THOUSANDS/UL (ref 149–390)
PMV BLD AUTO: 8.8 FL (ref 8.9–12.7)
POTASSIUM SERPL-SCNC: 3.7 MMOL/L (ref 3.5–5.3)
RBC # BLD AUTO: 4.74 MILLION/UL (ref 3.88–5.62)
SODIUM SERPL-SCNC: 139 MMOL/L (ref 136–145)
WBC # BLD AUTO: 9.38 THOUSAND/UL (ref 4.31–10.16)

## 2020-03-08 PROCEDURE — 85025 COMPLETE CBC W/AUTO DIFF WBC: CPT | Performed by: STUDENT IN AN ORGANIZED HEALTH CARE EDUCATION/TRAINING PROGRAM

## 2020-03-08 PROCEDURE — 80048 BASIC METABOLIC PNL TOTAL CA: CPT | Performed by: STUDENT IN AN ORGANIZED HEALTH CARE EDUCATION/TRAINING PROGRAM

## 2020-03-08 PROCEDURE — 99232 SBSQ HOSP IP/OBS MODERATE 35: CPT | Performed by: STUDENT IN AN ORGANIZED HEALTH CARE EDUCATION/TRAINING PROGRAM

## 2020-03-08 RX ORDER — PSEUDOEPHEDRINE HYDROCHLORIDE 30 MG/1
30 TABLET ORAL EVERY 6 HOURS PRN
Status: DISCONTINUED | OUTPATIENT
Start: 2020-03-08 | End: 2020-03-11 | Stop reason: HOSPADM

## 2020-03-08 RX ADMIN — SODIUM CHLORIDE 3 G: 9 INJECTION, SOLUTION INTRAVENOUS at 05:33

## 2020-03-08 RX ADMIN — ONDANSETRON 4 MG: 2 INJECTION INTRAMUSCULAR; INTRAVENOUS at 07:25

## 2020-03-08 RX ADMIN — DIAZEPAM 5 MG: 5 TABLET ORAL at 01:10

## 2020-03-08 RX ADMIN — ACETAMINOPHEN 975 MG: 325 TABLET, FILM COATED ORAL at 01:05

## 2020-03-08 RX ADMIN — MORPHINE SULFATE 2 MG: 2 INJECTION, SOLUTION INTRAMUSCULAR; INTRAVENOUS at 23:47

## 2020-03-08 RX ADMIN — LORATADINE 10 MG: 10 TABLET ORAL at 08:43

## 2020-03-08 RX ADMIN — ONDANSETRON 4 MG: 2 INJECTION INTRAMUSCULAR; INTRAVENOUS at 17:29

## 2020-03-08 RX ADMIN — Medication 250 MG: at 17:20

## 2020-03-08 RX ADMIN — DIAZEPAM 5 MG: 5 TABLET ORAL at 17:22

## 2020-03-08 RX ADMIN — CEFAZOLIN SODIUM 2000 MG: 2 SOLUTION INTRAVENOUS at 14:35

## 2020-03-08 RX ADMIN — AZELASTINE HYDROCHLORIDE 1 SPRAY: 137 SPRAY, METERED NASAL at 08:43

## 2020-03-08 RX ADMIN — DIAZEPAM 5 MG: 5 TABLET ORAL at 23:22

## 2020-03-08 RX ADMIN — OXYCODONE HYDROCHLORIDE 5 MG: 5 TABLET ORAL at 23:07

## 2020-03-08 RX ADMIN — CEFAZOLIN SODIUM 2000 MG: 2 SOLUTION INTRAVENOUS at 07:25

## 2020-03-08 RX ADMIN — SODIUM CHLORIDE 3 G: 9 INJECTION, SOLUTION INTRAVENOUS at 17:20

## 2020-03-08 RX ADMIN — ACETAMINOPHEN 975 MG: 325 TABLET, FILM COATED ORAL at 17:20

## 2020-03-08 RX ADMIN — ACETAMINOPHEN 975 MG: 325 TABLET, FILM COATED ORAL at 11:20

## 2020-03-08 RX ADMIN — OXYCODONE HYDROCHLORIDE 5 MG: 5 TABLET ORAL at 17:20

## 2020-03-08 RX ADMIN — SODIUM CHLORIDE 3 G: 9 INJECTION, SOLUTION INTRAVENOUS at 11:07

## 2020-03-08 RX ADMIN — ACETAMINOPHEN 975 MG: 325 TABLET, FILM COATED ORAL at 07:19

## 2020-03-08 RX ADMIN — Medication 250 MG: at 08:43

## 2020-03-08 RX ADMIN — FLUTICASONE PROPIONATE 1 SPRAY: 50 SPRAY, METERED NASAL at 08:43

## 2020-03-08 RX ADMIN — CEFAZOLIN SODIUM 2000 MG: 2 SOLUTION INTRAVENOUS at 23:43

## 2020-03-08 RX ADMIN — ENOXAPARIN SODIUM 40 MG: 40 INJECTION SUBCUTANEOUS at 08:43

## 2020-03-08 RX ADMIN — ACYCLOVIR 400 MG: 200 CAPSULE ORAL at 08:43

## 2020-03-08 RX ADMIN — SODIUM CHLORIDE 3 G: 9 INJECTION, SOLUTION INTRAVENOUS at 23:04

## 2020-03-08 RX ADMIN — AZELASTINE HYDROCHLORIDE 1 SPRAY: 137 SPRAY, METERED NASAL at 17:20

## 2020-03-08 RX ADMIN — PANTOPRAZOLE SODIUM 40 MG: 40 TABLET, DELAYED RELEASE ORAL at 07:20

## 2020-03-08 RX ADMIN — CEFAZOLIN SODIUM 2000 MG: 2 SOLUTION INTRAVENOUS at 01:06

## 2020-03-08 RX ADMIN — ACYCLOVIR 400 MG: 200 CAPSULE ORAL at 20:44

## 2020-03-08 NOTE — PLAN OF CARE
Problem: Potential for Falls  Goal: Patient will remain free of falls  Description  INTERVENTIONS:  - Assess patient frequently for physical needs  -  Identify cognitive and physical deficits and behaviors that affect risk of falls    -  [place] fall precautions as indicated by assessment   - Educate patient/family on patient safety including physical limitations  - Instruct patient to call for assistance with activity based on assessment  - Modify environment to reduce risk of injury  - Consider OT/PT consult to assist with strengthening/mobility  Outcome: Progressing     Problem: PAIN - ADULT  Goal: Verbalizes/displays adequate comfort level or baseline comfort level  Description  Interventions:  - Encourage patient to monitor pain and request assistance  - Assess pain using appropriate pain scale  - Administer analgesics based on type and severity of pain and evaluate response  - Implement non-pharmacological measures as appropriate and evaluate response  - Consider cultural and social influences on pain and pain management  - Notify physician/advanced practitioner if interventions unsuccessful or patient reports new pain  Outcome: Progressing     Problem: INFECTION - ADULT  Goal: Absence or prevention of progression during hospitalization  Description  INTERVENTIONS:  - Assess and monitor for signs and symptoms of infection  - Monitor lab/diagnostic results  - Monitor all insertion sites, i e  indwelling lines, tubes, and drains  - Monitor endotracheal if appropriate and nasal secretions for changes in amount and color  - [place] appropriate cooling/warming therapies per order  - Administer medications as ordered  - Instruct and encourage patient and family to use good hand hygiene technique  - Identify and instruct in appropriate isolation precautions for identified infection/condition  Outcome: Progressing     Problem: SAFETY ADULT  Goal: Patient will remain free of falls  Description  INTERVENTIONS:  - Assess patient frequently for physical needs  -  Identify cognitive and physical deficits and behaviors that affect risk of falls  -  [place] fall precautions as indicated by assessment   - Educate patient/family on patient safety including physical limitations  - Instruct patient to call for assistance with activity based on assessment  - Modify environment to reduce risk of injury  - Consider OT/PT consult to assist with strengthening/mobility  Outcome: Progressing     Problem: DISCHARGE PLANNING  Goal: Discharge to home or other facility with appropriate resources  Description  INTERVENTIONS:  - Identify barriers to discharge w/patient and caregiver  - Arrange for needed discharge resources and transportation as appropriate  - Identify discharge learning needs (meds, wound care, etc )  - Arrange for interpretive services to assist at discharge as needed  - Refer to Case Management Department for coordinating discharge planning if the patient needs post-hospital services based on physician/advanced practitioner order or complex needs related to functional status, cognitive ability, or social support system  Outcome: Progressing     Problem: Knowledge Deficit  Goal: Patient/family/caregiver demonstrates understanding of disease process, treatment plan, medications, and discharge instructions  Description  Complete learning assessment and assess knowledge base    Interventions:  - Provide teaching at level of understanding  - Provide teaching via preferred learning methods  Outcome: Progressing     Problem: MUSCULOSKELETAL - ADULT  Goal: Maintain or return mobility to safest level of function  Description  INTERVENTIONS:  - Assess patient's ability to carry out ADLs; assess patient's baseline for ADL function and identify physical deficits which impact ability to perform ADLs (bathing, care of mouth/teeth, toileting, grooming, dressing, etc )  - Assess/evaluate cause of self-care deficits   - Assess range of motion  - Assess patient's mobility  - Assess patient's need for assistive devices and provide as appropriate  - Encourage maximum independence but intervene and supervise when necessary  - Involve family in performance of ADLs  - Assess for home care needs following discharge   - Consider OT consult to assist with ADL evaluation and planning for discharge  - Provide patient education as appropriate  Outcome: Progressing

## 2020-03-09 PROBLEM — H66.90 OTITIS MEDIA: Status: ACTIVE | Noted: 2020-03-04

## 2020-03-09 PROCEDURE — 87205 SMEAR GRAM STAIN: CPT | Performed by: OTOLARYNGOLOGY

## 2020-03-09 PROCEDURE — 99232 SBSQ HOSP IP/OBS MODERATE 35: CPT | Performed by: STUDENT IN AN ORGANIZED HEALTH CARE EDUCATION/TRAINING PROGRAM

## 2020-03-09 PROCEDURE — 99024 POSTOP FOLLOW-UP VISIT: CPT | Performed by: OTOLARYNGOLOGY

## 2020-03-09 PROCEDURE — 97163 PT EVAL HIGH COMPLEX 45 MIN: CPT

## 2020-03-09 PROCEDURE — 87070 CULTURE OTHR SPECIMN AEROBIC: CPT | Performed by: OTOLARYNGOLOGY

## 2020-03-09 RX ORDER — DIAZEPAM 5 MG/1
5 TABLET ORAL EVERY 6 HOURS PRN
Status: DISCONTINUED | OUTPATIENT
Start: 2020-03-09 | End: 2020-03-11 | Stop reason: HOSPADM

## 2020-03-09 RX ORDER — MECLIZINE HYDROCHLORIDE 25 MG/1
25 TABLET ORAL EVERY 8 HOURS PRN
Status: DISCONTINUED | OUTPATIENT
Start: 2020-03-09 | End: 2020-03-11 | Stop reason: HOSPADM

## 2020-03-09 RX ORDER — OXYCODONE HYDROCHLORIDE 5 MG/1
5 TABLET ORAL EVERY 4 HOURS PRN
Status: DISCONTINUED | OUTPATIENT
Start: 2020-03-09 | End: 2020-03-11 | Stop reason: HOSPADM

## 2020-03-09 RX ORDER — LANOLIN ALCOHOL/MO/W.PET/CERES
6 CREAM (GRAM) TOPICAL
Status: DISCONTINUED | OUTPATIENT
Start: 2020-03-09 | End: 2020-03-11 | Stop reason: HOSPADM

## 2020-03-09 RX ORDER — OXYCODONE HYDROCHLORIDE 10 MG/1
10 TABLET ORAL EVERY 4 HOURS PRN
Status: DISCONTINUED | OUTPATIENT
Start: 2020-03-09 | End: 2020-03-11 | Stop reason: HOSPADM

## 2020-03-09 RX ORDER — CIPROFLOXACIN AND DEXAMETHASONE 3; 1 MG/ML; MG/ML
5 SUSPENSION/ DROPS AURICULAR (OTIC) 2 TIMES DAILY
Status: DISCONTINUED | OUTPATIENT
Start: 2020-03-09 | End: 2020-03-11 | Stop reason: HOSPADM

## 2020-03-09 RX ORDER — ACETAMINOPHEN 325 MG/1
975 TABLET ORAL EVERY 8 HOURS SCHEDULED
Status: DISCONTINUED | OUTPATIENT
Start: 2020-03-09 | End: 2020-03-11 | Stop reason: HOSPADM

## 2020-03-09 RX ORDER — DOCUSATE SODIUM 100 MG/1
100 CAPSULE, LIQUID FILLED ORAL 2 TIMES DAILY
Status: DISCONTINUED | OUTPATIENT
Start: 2020-03-09 | End: 2020-03-11 | Stop reason: HOSPADM

## 2020-03-09 RX ORDER — POLYETHYLENE GLYCOL 3350 17 G/17G
17 POWDER, FOR SOLUTION ORAL DAILY PRN
Status: DISCONTINUED | OUTPATIENT
Start: 2020-03-09 | End: 2020-03-11 | Stop reason: HOSPADM

## 2020-03-09 RX ADMIN — SODIUM CHLORIDE 3 G: 9 INJECTION, SOLUTION INTRAVENOUS at 16:38

## 2020-03-09 RX ADMIN — SODIUM CHLORIDE 3 G: 9 INJECTION, SOLUTION INTRAVENOUS at 11:02

## 2020-03-09 RX ADMIN — Medication 250 MG: at 08:40

## 2020-03-09 RX ADMIN — ALUMINUM HYDROXIDE, MAGNESIUM HYDROXIDE, AND SIMETHICONE 30 ML: 200; 200; 20 SUSPENSION ORAL at 10:33

## 2020-03-09 RX ADMIN — FLUTICASONE PROPIONATE 1 SPRAY: 50 SPRAY, METERED NASAL at 08:40

## 2020-03-09 RX ADMIN — Medication 250 MG: at 18:22

## 2020-03-09 RX ADMIN — MORPHINE SULFATE 2 MG: 2 INJECTION, SOLUTION INTRAMUSCULAR; INTRAVENOUS at 19:43

## 2020-03-09 RX ADMIN — SODIUM CHLORIDE 3 G: 9 INJECTION, SOLUTION INTRAVENOUS at 05:16

## 2020-03-09 RX ADMIN — CEFAZOLIN SODIUM 2000 MG: 2 SOLUTION INTRAVENOUS at 15:12

## 2020-03-09 RX ADMIN — OXYCODONE HYDROCHLORIDE 10 MG: 10 TABLET ORAL at 22:59

## 2020-03-09 RX ADMIN — ENOXAPARIN SODIUM 40 MG: 40 INJECTION SUBCUTANEOUS at 08:40

## 2020-03-09 RX ADMIN — POLYETHYLENE GLYCOL 3350 17 G: 17 POWDER, FOR SOLUTION ORAL at 11:06

## 2020-03-09 RX ADMIN — ACETAMINOPHEN 975 MG: 325 TABLET, FILM COATED ORAL at 21:36

## 2020-03-09 RX ADMIN — OXYCODONE HYDROCHLORIDE 5 MG: 5 TABLET ORAL at 05:19

## 2020-03-09 RX ADMIN — DOCUSATE SODIUM 100 MG: 100 CAPSULE, LIQUID FILLED ORAL at 21:35

## 2020-03-09 RX ADMIN — ACETAMINOPHEN 975 MG: 325 TABLET, FILM COATED ORAL at 01:05

## 2020-03-09 RX ADMIN — AZELASTINE HYDROCHLORIDE 1 SPRAY: 137 SPRAY, METERED NASAL at 18:22

## 2020-03-09 RX ADMIN — AZELASTINE HYDROCHLORIDE 1 SPRAY: 137 SPRAY, METERED NASAL at 08:40

## 2020-03-09 RX ADMIN — CEFAZOLIN SODIUM 2000 MG: 2 SOLUTION INTRAVENOUS at 08:38

## 2020-03-09 RX ADMIN — DIAZEPAM 5 MG: 5 TABLET ORAL at 16:37

## 2020-03-09 RX ADMIN — SODIUM CHLORIDE 3 G: 9 INJECTION, SOLUTION INTRAVENOUS at 23:03

## 2020-03-09 RX ADMIN — OXYCODONE HYDROCHLORIDE 5 MG: 5 TABLET ORAL at 18:21

## 2020-03-09 RX ADMIN — ACYCLOVIR 400 MG: 200 CAPSULE ORAL at 21:36

## 2020-03-09 RX ADMIN — LORATADINE 10 MG: 10 TABLET ORAL at 08:40

## 2020-03-09 RX ADMIN — DOCUSATE SODIUM 100 MG: 100 CAPSULE, LIQUID FILLED ORAL at 11:02

## 2020-03-09 RX ADMIN — PANTOPRAZOLE SODIUM 40 MG: 40 TABLET, DELAYED RELEASE ORAL at 05:20

## 2020-03-09 RX ADMIN — ACYCLOVIR 400 MG: 200 CAPSULE ORAL at 08:41

## 2020-03-09 RX ADMIN — CIPROFLOXACIN AND DEXAMETHASONE 5 DROP: 3; 1 SUSPENSION/ DROPS AURICULAR (OTIC) at 18:23

## 2020-03-09 RX ADMIN — MELATONIN 6 MG: at 23:00

## 2020-03-09 RX ADMIN — METOCLOPRAMIDE 10 MG: 5 INJECTION, SOLUTION INTRAMUSCULAR; INTRAVENOUS at 11:06

## 2020-03-09 RX ADMIN — DIAZEPAM 5 MG: 5 TABLET ORAL at 07:25

## 2020-03-09 RX ADMIN — ACETAMINOPHEN 975 MG: 325 TABLET, FILM COATED ORAL at 07:22

## 2020-03-09 NOTE — ASSESSMENT & PLAN NOTE
Otitis media present on admission, failed outpatient treatment with patient's ENT from [place] on antihistamines and decongestants  Patient receive 4 days of IV cefazolin and Flagyl, currently on day 3 of IV Unasyn  Plan to discharge on oral Augmentin for total of antibiotic treatment for 4 weeks  As noted above case was discussed with ENT, recommending MRI with and without contrast to be performed as patient's pain and dizziness continues to be disproportionate postprocedure  MRI pending

## 2020-03-09 NOTE — PLAN OF CARE
Problem: Potential for Falls  Goal: Patient will remain free of falls  Description  INTERVENTIONS:  - Assess patient frequently for physical needs  -  Identify cognitive and physical deficits and behaviors that affect risk of falls    -  [place] fall precautions as indicated by assessment   - Educate patient/family on patient safety including physical limitations  - Instruct patient to call for assistance with activity based on assessment  - Modify environment to reduce risk of injury  - Consider OT/PT consult to assist with strengthening/mobility  Outcome: Progressing     Problem: PAIN - ADULT  Goal: Verbalizes/displays adequate comfort level or baseline comfort level  Description  Interventions:  - Encourage patient to monitor pain and request assistance  - Assess pain using appropriate pain scale  - Administer analgesics based on type and severity of pain and evaluate response  - Implement non-pharmacological measures as appropriate and evaluate response  - Consider cultural and social influences on pain and pain management  - Notify physician/advanced practitioner if interventions unsuccessful or patient reports new pain  Outcome: Progressing     Problem: INFECTION - ADULT  Goal: Absence or prevention of progression during hospitalization  Description  INTERVENTIONS:  - Assess and monitor for signs and symptoms of infection  - Monitor lab/diagnostic results  - Monitor all insertion sites, i e  indwelling lines, tubes, and drains  - Monitor endotracheal if appropriate and nasal secretions for changes in amount and color  - [place] appropriate cooling/warming therapies per order  - Administer medications as ordered  - Instruct and encourage patient and family to use good hand hygiene technique  - Identify and instruct in appropriate isolation precautions for identified infection/condition  Outcome: Progressing     Problem: SAFETY ADULT  Goal: Patient will remain free of falls  Description  INTERVENTIONS:  - Assess patient frequently for physical needs  -  Identify cognitive and physical deficits and behaviors that affect risk of falls  -  [place] fall precautions as indicated by assessment   - Educate patient/family on patient safety including physical limitations  - Instruct patient to call for assistance with activity based on assessment  - Modify environment to reduce risk of injury  - Consider OT/PT consult to assist with strengthening/mobility  Outcome: Progressing     Problem: DISCHARGE PLANNING  Goal: Discharge to home or other facility with appropriate resources  Description  INTERVENTIONS:  - Identify barriers to discharge w/patient and caregiver  - Arrange for needed discharge resources and transportation as appropriate  - Identify discharge learning needs (meds, wound care, etc )  - Arrange for interpretive services to assist at discharge as needed  - Refer to Case Management Department for coordinating discharge planning if the patient needs post-hospital services based on physician/advanced practitioner order or complex needs related to functional status, cognitive ability, or social support system  Outcome: Progressing     Problem: Knowledge Deficit  Goal: Patient/family/caregiver demonstrates understanding of disease process, treatment plan, medications, and discharge instructions  Description  Complete learning assessment and assess knowledge base    Interventions:  - Provide teaching at level of understanding  - Provide teaching via preferred learning methods  Outcome: Progressing     Problem: MUSCULOSKELETAL - ADULT  Goal: Maintain or return mobility to safest level of function  Description  INTERVENTIONS:  - Assess patient's ability to carry out ADLs; assess patient's baseline for ADL function and identify physical deficits which impact ability to perform ADLs (bathing, care of mouth/teeth, toileting, grooming, dressing, etc )  - Assess/evaluate cause of self-care deficits   - Assess range of motion  - Assess patient's mobility  - Assess patient's need for assistive devices and provide as appropriate  - Encourage maximum independence but intervene and supervise when necessary  - Involve family in performance of ADLs  - Assess for home care needs following discharge   - Consider OT consult to assist with ADL evaluation and planning for discharge  - Provide patient education as appropriate  Outcome: Progressing

## 2020-03-09 NOTE — PROGRESS NOTES
Progress Note Corie Pemberton 1960, 61 y o  male MRN: [id number]    Unit/Bed#: [address] [place] [address] Encounter: [id number]    Primary Care Provider: No primary care provider on file  Date and time admitted to hospital: 3/4/2020  4:24 PM        * Acute mastoiditis of right side  Assessment & Plan  Hx of post nasal drip and right middle ear effusion being treated by ENT with antihistamines and decongestants  Now with worsening pain, vertigo, nausea, and vomiting  CTA head and neck: 'Suspect right otomastoiditis  Pansinusitis '  · ENT consulted, recs appreciated  · Continue home antihistamines and decongestants  · Pain control:  Tylenol 975 mg q 8 hours scheduled,, toradol 15mg q6hr, oxycodone 5mg q4hr PRN  · S/p right myringotomy and aspiration on 3/6/20  · Patient received 4 days of IV Cefazolin + Flagyl, changed to 3 days of IV unasyn, will DC on PO Augmentin for total Abx for 4 weeks  · Discussed case with ENT, recommend MRI of head as patient continues to have disproportionate pain and dizziness postprocedure  · MRI ordered, pending  · Patient will need ENT follow up after DC, which he will do in [place]    Otitis media  Assessment & Plan  Otitis media present on admission, failed outpatient treatment with patient's ENT from [place] on antihistamines and decongestants  Patient receive 4 days of IV cefazolin and Flagyl, currently on day 3 of IV Unasyn  Plan to discharge on oral Augmentin for total of antibiotic treatment for 4 weeks  As noted above case was discussed with ENT, recommending MRI with and without contrast to be performed as patient's pain and dizziness continues to be disproportionate postprocedure  MRI pending  Post-nasal drip  Assessment & Plan  Complains of excessive postnasal drip for years  Follows outpatient ENT who has him on Sudafed, Zyrtec, Flonase and nasal washes    Has a follow-up with ENT on 3/13/2020  · Continue Flonase, Claritin, and Sudafed as needed  · Also started trial of Astelin nasal spray  · ENT recs appreciated    History of diffuse large B cell lymphoma (HCC)  Assessment & Plan  Has been in remission since 2015  · Continue home medication, Acyclovir 400 mg twice daily  · Outpatient Oncology follow-up    Class 2 obesity due to excess calories without serious comorbidity with body mass index (BMI) of 35 0 to 35 9 in adult  Assessment & Plan  Body mass index is 35 8 kg/m²  Would benefit from weight loss    Vertigo  Assessment & Plan  Likely due to acute ostitis media, associated with nausea, vomiting, and gait instability x1 day  Was given Valium 5 mg po x1 in ED with improvement in symptoms  · Meclizine 25 mg q8h  · Valium 5 mg po PRN  · PT eval appreciated- patient will need outpatient balance center       Sepsis (HCC)resolved as of 3/7/2020  Assessment & Plan  POA, as evidence by tachycardia and leukocytosis in the setting of otomastoiditis   · blood cultures- negative at 72 hours  · Monitor WBC- normalized  · Patient afebrile          VTE Pharmacologic Prophylaxis:   Pharmacologic: Enoxaparin (Lovenox)  Mechanical VTE Prophylaxis in Place: Yes    Patient Centered Rounds: I have performed bedside rounds with nursing staff today  Discussions with Specialists or Other Care Team Provider:  Attending physician, ENT and multi disciplinary team     Education and Discussions with Family / Patient:  I spoke with the patient at the bedside, I have answered all questions to the best of my abilities  Time Spent for Care: 30 minutes  More than 50% of total time spent on counseling and coordination of care as described above  Current Length of Stay: 5 day(s)    Current Patient Status: Inpatient   Certification Statement: The patient will continue to require additional inpatient hospital stay due to MRI pending, ENT to re-evaluate patient this evening  Discharge Plan:  Most likely stable for discharge in the next 24 hours      Code Status: Level 1 - Full Code      Subjective: Patient seen sitting comfortably in chair, denies headache, dizziness, shortness of breath, chest pain, nausea, vomiting or diarrhea  Reports that his ear pain continues, however it is slightly better than previous  Does not actively have any drainage, however there was some dried drainage noted inside the ear and also on the pillow case  It was tan in color  Objective:     Vitals:   Temp (24hrs), Av 1 °F (36 7 °C), Min:97 8 °F (36 6 °C), Max:98 9 °F (37 2 °C)    Temp:  [97 8 °F (36 6 °C)-98 9 °F (37 2 °C)] 98 9 °F (37 2 °C)  HR:  [69-87] 75  Resp:  [16-20] 18  BP: (150-162)/() 154/97  SpO2:  [91 %-96 %] 92 %  Body mass index is 35 8 kg/m²  Input and Output Summary (last 24 hours): Intake/Output Summary (Last 24 hours) at 3/9/2020 1815  Last data filed at 3/9/2020 0901  Gross per 24 hour   Intake 550 ml   Output 1525 ml   Net -975 ml       Physical Exam:     Physical Exam   Constitutional: He is oriented to person, place, and time  He appears well-nourished  No distress  HENT:   Head: Normocephalic and atraumatic  Right ear noted to have small amount of dried tan colored drainage  Eyes: Pupils are equal, round, and reactive to light  Conjunctivae and EOM are normal    Neck: Normal range of motion  Cardiovascular: Normal rate, regular rhythm and normal heart sounds  Pulmonary/Chest: Effort normal and breath sounds normal  No respiratory distress  Abdominal: Soft  Bowel sounds are normal  He exhibits no distension  There is no tenderness  Genitourinary:   Genitourinary Comments: Void spontaneously  Musculoskeletal: Normal range of motion  He exhibits no edema  Neurological: He is alert and oriented to person, place, and time  Skin: Skin is warm and dry  Capillary refill takes less than 2 seconds  Psychiatric: He has a normal mood and affect  His behavior is normal  Judgment and thought content normal    Nursing note and vitals reviewed        Additional Data: Labs:    Results from last 7 days   Lab Units 03/08/20  0641   WBC Thousand/uL 9 38   HEMOGLOBIN g/dL 14 4   HEMATOCRIT % 43 9   PLATELETS Thousands/uL 180   NEUTROS PCT % 76*   LYMPHS PCT % 16   MONOS PCT % 8   EOS PCT % 0     Results from last 7 days   Lab Units 03/08/20  0641  03/05/20  0524   POTASSIUM mmol/L 3 7   < > 3 6   CHLORIDE mmol/L 101   < > 101   CO2 mmol/L 31   < > 28   BUN mg/dL 11   < > 13   CREATININE mg/dL 0 94   < > 1 09   CALCIUM mg/dL 8 5   < > 8 5   ALK PHOS U/L  --   --  70   ALT U/L  --   --  18   AST U/L  --   --  11    < > = values in this interval not displayed  Results from last 7 days   Lab Units 03/04/20  1651   INR  1 07       * I Have Reviewed All Lab Data Listed Above  * Additional Pertinent Lab Tests Reviewed: All Labs Within Last 24 Hours Reviewed    Imaging:    Imaging Reports Reviewed Today Include:  None  Imaging Personally Reviewed by Myself Includes:  None  Recent Cultures (last 7 days):     Results from last 7 days   Lab Units 03/04/20 2001   BLOOD CULTURE  No Growth After 4 Days  No Growth After 4 Days         Last 24 Hours Medication List:     Current Facility-Administered Medications:  acetaminophen 975 mg Oral [facility] EDITA Palm    acyclovir 400 mg Oral Q12H [address] EDITA Sue    aluminum-magnesium hydroxide-simethicone 30 mL Oral Q6H PRN EDITA Sue    ampicillin-sulbactam 3 g Intravenous Q6H Delmy Graves MD Last Rate: 3 g (03/09/20 1638)   azelastine 1 spray Each Nare BID EDITA Sue    ciprofloxacin-dexamethasone 5 drop Right Ear BID Nick Navarro MD    diazepam 5 mg Oral Q6H PRN EDITA Sue    docusate sodium 100 mg Oral BID Delmy Graves MD    enoxaparin 40 mg Subcutaneous Q24H [address] EDITA Sue    fluticasone 1 spray Each Nare Daily EDITA Sue    loratadine 10 mg Oral Daily EDITA Sue    meclizine 25 mg Oral Q8H PRN EDITA Palm metoclopramide 10 mg Intravenous Q6H PRN Kal Cardozo MD    morphine injection 2 mg Intravenous Q4H PRN EDITA Melvin    ondansetron 4 mg Intravenous Q6H PRN EDITA Melvin    oxyCODONE 5 mg Oral Q4H PRN Kal Cardozo MD    pantoprazole 40 mg Oral Early Morning Kal Cardozo MD    polyethylene glycol 17 g Oral Daily PRN Kal Cardozo MD    pseudoephedrine 30 mg Oral Q6H PRN Kal Cardozo MD    saccharomyces boulardii 250 mg Oral BID Kal Cardozo MD         Today, Patient Was Seen By: EDITA Corea    ** Please Note: Dictation voice to text software may have been used in the creation of this document   **

## 2020-03-09 NOTE — PROGRESS NOTES
Progress Note - Amrit Pozo 61 y o  male MRN: [id number]    Unit/Bed#: 2 [place] [address] Encounter: [id number]      Assessment:  Right otitis media and otitis externa, not improving despite aggressive treatment  Plan:  MRI ordered to r/o possible occult recurrence of lymphoma  Right ear cleaned out again, and cultured  Cipro ear drops were inexplicably d/c'd the day after I ordered them  The patient clearly needs to be on them  Will schedule pt for right myringotomy and tube insertion (under general anesthesia) tomorrow  Please keep pt NPO after midnight  Subjective:   Right ear still very painful  Per pt, it is exacerbated when he receives his IV abx  Objective:     Vitals: Blood pressure 154/97, pulse 75, temperature 98 9 °F (37 2 °C), temperature source Oral, resp  rate 18, height 6' (1 829 m), weight 120 kg (264 lb), SpO2 92 %  ,Body mass index is 35 8 kg/m²  Intake/Output Summary (Last 24 hours) at 3/9/2020 1752  Last data filed at 3/9/2020 0901  Gross per 24 hour   Intake 550 ml   Output 1525 ml   Net -975 ml       Physical Exam:   AD: right EAC cleaned of purulent debris, TM still opacified and hyperemic, but not bulging  Very tender to touch  Invasive Devices     Peripheral Intravenous Line            Peripheral IV 03/08/20 Left Hand 1 day                Lab, Imaging and other studies: I have personally reviewed pertinent reports      VTE Pharmacologic Prophylaxis: Sequential compression device (Venodyne)   VTE Mechanical Prophylaxis: sequential compression device

## 2020-03-09 NOTE — PHYSICAL THERAPY NOTE
PT EVALUATION       03/09/20 1030   Note Type   Note type Eval only   Pain Assessment   Pain Assessment No/denies pain   Home Living   Type of [address] Two level;Bed/bath upstairs   Home Equipment   (no DME per pt)   Prior Function   Level of [place] Independent with ADLs and functional mobility   Lives With Son   Receives Help From Family   ADL Assistance Independent   IADLs Independent   Comments Pt amb w/out AD PTA  Pt is a  and is from [facility]  Pt reports his son is coming form [place] to pick him up and drive him home on wed or thur of this week  Pt reports when he is home he will follow-up with his ENT and PT for balance when in [place]  Restrictions/Precautions   Other Precautions Fall Risk  (dizzy)   General   Additional Pertinent History Pt adm with worsening right ear pain with nause, vomiting and amb dysfxn  Upon PT eval, pt reports that the dizziness is much improved than when he was first admitted  Family/Caregiver Present No   Cognition   Overall Cognitive Status WFL   Arousal/Participation Cooperative   Orientation Level Oriented X4   Following Commands Follows all commands and directions without difficulty   RLE Assessment   RLE Assessment WFL  (4-/5)   LLE Assessment   LLE Assessment WFL  (4-/5)   Transfers   Sit to Stand 7  Independent   Stand to Sit 7  Independent   Ambulation/Elevation   Gait pattern   (unsteady due to dizziness)   Gait Assistance   (close S/min A)   Additional items Assist x 1;Verbal cues; Tactile cues   Assistive Device None  (pt holding onto IV pole)   Distance 150 feet with change in direction   Balance   Static Sitting Good   Static Standing Fair   Dynamic Standing Fair -   Ambulatory Poor +   Assessment   Prognosis Good   Problem List Decreased strength;Decreased range of motion;Decreased endurance; Impaired balance;Decreased mobility; Decreased coordination;Decreased safety awareness   Assessment Patient seen for Physical Therapy evaluation  Patient admitted with Acute mastoiditis of right side  Comorbidities affecting patient's physical performance include: vertigo, large B cell lymphoma, obesity  Personal factors affecting patient at time of initial evaluation include: lives in two story house, stairs to enter home, inability to navigate community distances, inability to navigate level surfaces without external assistance and inability to perform dynamic tasks in community  Prior to admission, patient was independent with functional mobility without assistive device, independent with ADLS, independent with IADLS, living with son in a two level home with ? ? steps to enter, ambulating household distance, ambulating community distances and works full time  Please find objective findings from Physical Therapy assessment regarding body systems outlined above with impairments and limitations including weakness, decreased ROM, impaired balance, decreased endurance, impaired coordination, gait deviations, decreased activity tolerance, decreased functional mobility tolerance, decreased safety awareness and fall risk  The [place] Index was used as a functional outcome tool presenting with a score of 60 today indicating moderate limitations of functional mobility and ADLS  Patient's clinical presentation is currently unstable/unpredictable as seen in patient's presentation of vital sign response, increased fall risk, new onset of impairment of functional mobility, decreased endurance and new onset of weakness  Pt would benefit from continued Physical Therapy treatment to address deficits as defined above and maximize level of functional mobility  As demonstrated by objective findings, the assigned level of complexity for this evaluation is high     Goals   Patient Goals get back to [facility] Expiration Date 03/16/20   Short Term Goal #1 bed mob and trans - I;   Short Term Goal #2 pt will amb w/wout AD functional household distances - S/I; balance - F+ for safe mobility and to decrease fall risk; up/down full flight of steps - S   LTG Expiration Date 03/23/20   Long Term Goal #1 Pt will return to prior independent functional mobility, levels and unlevels without AD; balance - G for all mobility and to decrease fall risk   Long Term Goal #2 strength LEs - 4 to 4+/5; pt will return to full-time    Plan   Treatment/Interventions ADL retraining;Functional transfer training;LE strengthening/ROM; Elevations; Therapeutic exercise; Endurance training;Patient/family training;Equipment eval/education; Bed mobility;Gait training; Compensatory technique education   PT Frequency 5x/wk   Recommendation   Recommendation Home with family support;home w son to [place]   Additional Comments Pt will benefit from cont OP PT at a [address] [place] [address] for Vestibular rehabilitation  PT explained this to pt and pt agrees and will follow-up in [place]  Pt's son coming to pick pt up mid-week     Barthel Index   Feeding 10   Bathing 0   Grooming Score 0   Dressing Score 5   Bladder Score 10   Bowels Score 10   Toilet Use Score 5   Transfers (Bed/Chair) Score 10   Mobility (Level Surface) Score 10   Stairs Score 0   Bart[place] Index Score 61   Licensure   [place] License Number  [id number] [place] [address] [id number]

## 2020-03-10 ENCOUNTER — ANESTHESIA (INPATIENT)
Dept: PERIOP | Facility: HOSPITAL | Age: 60
DRG: 854 | End: 2020-03-10
Payer: COMMERCIAL

## 2020-03-10 ENCOUNTER — ANESTHESIA EVENT (INPATIENT)
Dept: PERIOP | Facility: HOSPITAL | Age: 60
DRG: 854 | End: 2020-03-10
Payer: COMMERCIAL

## 2020-03-10 LAB
BACTERIA BLD CULT: NORMAL
BACTERIA BLD CULT: NORMAL
BASOPHILS # BLD AUTO: 0.03 THOUSANDS/ΜL (ref 0–0.1)
BASOPHILS NFR BLD AUTO: 0 % (ref 0–1)
EOSINOPHIL # BLD AUTO: 0.05 THOUSAND/ΜL (ref 0–0.61)
EOSINOPHIL NFR BLD AUTO: 1 % (ref 0–6)
ERYTHROCYTE [DISTWIDTH] IN BLOOD BY AUTOMATED COUNT: 12.5 % (ref 11.6–15.1)
HCT VFR BLD AUTO: 44.3 % (ref 36.5–49.3)
HGB BLD-MCNC: 14.2 G/DL (ref 12–17)
IMM GRANULOCYTES # BLD AUTO: 0.04 THOUSAND/UL (ref 0–0.2)
IMM GRANULOCYTES NFR BLD AUTO: 1 % (ref 0–2)
LYMPHOCYTES # BLD AUTO: 1.94 THOUSANDS/ΜL (ref 0.6–4.47)
LYMPHOCYTES NFR BLD AUTO: 23 % (ref 14–44)
MCH RBC QN AUTO: 30.2 PG (ref 26.8–34.3)
MCHC RBC AUTO-ENTMCNC: 32.1 G/DL (ref 31.4–37.4)
MCV RBC AUTO: 94 FL (ref 82–98)
MONOCYTES # BLD AUTO: 0.78 THOUSAND/ΜL (ref 0.17–1.22)
MONOCYTES NFR BLD AUTO: 9 % (ref 4–12)
NEUTROPHILS # BLD AUTO: 5.54 THOUSANDS/ΜL (ref 1.85–7.62)
NEUTS SEG NFR BLD AUTO: 66 % (ref 43–75)
NRBC BLD AUTO-RTO: 0 /100 WBCS
PLATELET # BLD AUTO: 205 THOUSANDS/UL (ref 149–390)
PMV BLD AUTO: 8.6 FL (ref 8.9–12.7)
RBC # BLD AUTO: 4.7 MILLION/UL (ref 3.88–5.62)
WBC # BLD AUTO: 8.38 THOUSAND/UL (ref 4.31–10.16)

## 2020-03-10 PROCEDURE — 69436 CREATE EARDRUM OPENING: CPT | Performed by: OTOLARYNGOLOGY

## 2020-03-10 PROCEDURE — 99232 SBSQ HOSP IP/OBS MODERATE 35: CPT | Performed by: NURSE PRACTITIONER

## 2020-03-10 PROCEDURE — 099570Z DRAINAGE OF RIGHT MIDDLE EAR WITH DRAINAGE DEVICE, VIA NATURAL OR ARTIFICIAL OPENING: ICD-10-PCS | Performed by: OTOLARYNGOLOGY

## 2020-03-10 PROCEDURE — 85025 COMPLETE CBC W/AUTO DIFF WBC: CPT | Performed by: NURSE PRACTITIONER

## 2020-03-10 DEVICE — VENT TUBE 1010030 5PK ARMSTR GROMMET FLP
Type: IMPLANTABLE DEVICE | Site: EAR | Status: FUNCTIONAL
Brand: ARMSTRONG

## 2020-03-10 RX ORDER — SODIUM CHLORIDE, SODIUM LACTATE, POTASSIUM CHLORIDE, CALCIUM CHLORIDE 600; 310; 30; 20 MG/100ML; MG/100ML; MG/100ML; MG/100ML
INJECTION, SOLUTION INTRAVENOUS CONTINUOUS PRN
Status: DISCONTINUED | OUTPATIENT
Start: 2020-03-10 | End: 2020-03-10 | Stop reason: SURG

## 2020-03-10 RX ORDER — ONDANSETRON 2 MG/ML
4 INJECTION INTRAMUSCULAR; INTRAVENOUS ONCE AS NEEDED
Status: DISCONTINUED | OUTPATIENT
Start: 2020-03-10 | End: 2020-03-10 | Stop reason: HOSPADM

## 2020-03-10 RX ORDER — SODIUM CHLORIDE, SODIUM LACTATE, POTASSIUM CHLORIDE, CALCIUM CHLORIDE 600; 310; 30; 20 MG/100ML; MG/100ML; MG/100ML; MG/100ML
100 INJECTION, SOLUTION INTRAVENOUS CONTINUOUS
Status: DISCONTINUED | OUTPATIENT
Start: 2020-03-10 | End: 2020-03-11

## 2020-03-10 RX ORDER — PROPOFOL 10 MG/ML
INJECTION, EMULSION INTRAVENOUS AS NEEDED
Status: DISCONTINUED | OUTPATIENT
Start: 2020-03-10 | End: 2020-03-10 | Stop reason: SURG

## 2020-03-10 RX ORDER — OXYMETAZOLINE HYDROCHLORIDE 0.05 G/100ML
SPRAY NASAL AS NEEDED
Status: DISCONTINUED | OUTPATIENT
Start: 2020-03-10 | End: 2020-03-10 | Stop reason: HOSPADM

## 2020-03-10 RX ORDER — LIDOCAINE HYDROCHLORIDE 10 MG/ML
INJECTION, SOLUTION EPIDURAL; INFILTRATION; INTRACAUDAL; PERINEURAL AS NEEDED
Status: DISCONTINUED | OUTPATIENT
Start: 2020-03-10 | End: 2020-03-10 | Stop reason: SURG

## 2020-03-10 RX ORDER — FENTANYL CITRATE 50 UG/ML
INJECTION, SOLUTION INTRAMUSCULAR; INTRAVENOUS AS NEEDED
Status: DISCONTINUED | OUTPATIENT
Start: 2020-03-10 | End: 2020-03-10 | Stop reason: SURG

## 2020-03-10 RX ORDER — ONDANSETRON 2 MG/ML
INJECTION INTRAMUSCULAR; INTRAVENOUS AS NEEDED
Status: DISCONTINUED | OUTPATIENT
Start: 2020-03-10 | End: 2020-03-10 | Stop reason: SURG

## 2020-03-10 RX ORDER — FENTANYL CITRATE/PF 50 MCG/ML
50 SYRINGE (ML) INJECTION
Status: DISCONTINUED | OUTPATIENT
Start: 2020-03-10 | End: 2020-03-10 | Stop reason: HOSPADM

## 2020-03-10 RX ORDER — SODIUM CHLORIDE, SODIUM LACTATE, POTASSIUM CHLORIDE, CALCIUM CHLORIDE 600; 310; 30; 20 MG/100ML; MG/100ML; MG/100ML; MG/100ML
125 INJECTION, SOLUTION INTRAVENOUS CONTINUOUS
Status: DISCONTINUED | OUTPATIENT
Start: 2020-03-10 | End: 2020-03-10 | Stop reason: SDUPTHER

## 2020-03-10 RX ORDER — OFLOXACIN 3 MG/ML
SOLUTION/ DROPS OPHTHALMIC AS NEEDED
Status: DISCONTINUED | OUTPATIENT
Start: 2020-03-10 | End: 2020-03-10 | Stop reason: HOSPADM

## 2020-03-10 RX ORDER — HYDROCODONE BITARTRATE AND ACETAMINOPHEN 5; 325 MG/1; MG/1
1 TABLET ORAL EVERY 4 HOURS PRN
Status: DISCONTINUED | OUTPATIENT
Start: 2020-03-10 | End: 2020-03-11 | Stop reason: HOSPADM

## 2020-03-10 RX ORDER — DEXAMETHASONE SODIUM PHOSPHATE 4 MG/ML
INJECTION, SOLUTION INTRA-ARTICULAR; INTRALESIONAL; INTRAMUSCULAR; INTRAVENOUS; SOFT TISSUE AS NEEDED
Status: DISCONTINUED | OUTPATIENT
Start: 2020-03-10 | End: 2020-03-10 | Stop reason: SURG

## 2020-03-10 RX ADMIN — SODIUM CHLORIDE 3 G: 9 INJECTION, SOLUTION INTRAVENOUS at 11:02

## 2020-03-10 RX ADMIN — DOCUSATE SODIUM 100 MG: 100 CAPSULE, LIQUID FILLED ORAL at 21:18

## 2020-03-10 RX ADMIN — HYDROCODONE BITARTRATE AND ACETAMINOPHEN 1 TABLET: 5; 325 TABLET ORAL at 23:17

## 2020-03-10 RX ADMIN — PANTOPRAZOLE SODIUM 40 MG: 40 TABLET, DELAYED RELEASE ORAL at 05:00

## 2020-03-10 RX ADMIN — SODIUM CHLORIDE 3 G: 9 INJECTION, SOLUTION INTRAVENOUS at 04:59

## 2020-03-10 RX ADMIN — DEXAMETHASONE SODIUM PHOSPHATE 4 MG: 4 INJECTION, SOLUTION INTRA-ARTICULAR; INTRALESIONAL; INTRAMUSCULAR; INTRAVENOUS; SOFT TISSUE at 09:25

## 2020-03-10 RX ADMIN — SODIUM CHLORIDE, SODIUM LACTATE, POTASSIUM CHLORIDE, AND CALCIUM CHLORIDE 100 ML/HR: .6; .31; .03; .02 INJECTION, SOLUTION INTRAVENOUS at 13:45

## 2020-03-10 RX ADMIN — LIDOCAINE HYDROCHLORIDE 50 MG: 10 INJECTION, SOLUTION EPIDURAL; INFILTRATION; INTRACAUDAL; PERINEURAL at 09:18

## 2020-03-10 RX ADMIN — Medication 250 MG: at 17:51

## 2020-03-10 RX ADMIN — ACYCLOVIR 400 MG: 200 CAPSULE ORAL at 10:56

## 2020-03-10 RX ADMIN — FLUTICASONE PROPIONATE 1 SPRAY: 50 SPRAY, METERED NASAL at 21:18

## 2020-03-10 RX ADMIN — CIPROFLOXACIN AND DEXAMETHASONE 5 DROP: 3; 1 SUSPENSION/ DROPS AURICULAR (OTIC) at 17:53

## 2020-03-10 RX ADMIN — MELATONIN 6 MG: at 21:18

## 2020-03-10 RX ADMIN — FENTANYL CITRATE 50 MCG: 50 INJECTION, SOLUTION INTRAMUSCULAR; INTRAVENOUS at 09:18

## 2020-03-10 RX ADMIN — Medication 250 MG: at 10:58

## 2020-03-10 RX ADMIN — ACYCLOVIR 400 MG: 200 CAPSULE ORAL at 21:18

## 2020-03-10 RX ADMIN — ONDANSETRON 4 MG: 2 INJECTION INTRAMUSCULAR; INTRAVENOUS at 09:25

## 2020-03-10 RX ADMIN — ENOXAPARIN SODIUM 40 MG: 40 INJECTION SUBCUTANEOUS at 10:56

## 2020-03-10 RX ADMIN — ONDANSETRON 4 MG: 2 INJECTION INTRAMUSCULAR; INTRAVENOUS at 05:06

## 2020-03-10 RX ADMIN — CIPROFLOXACIN AND DEXAMETHASONE 5 DROP: 3; 1 SUSPENSION/ DROPS AURICULAR (OTIC) at 08:25

## 2020-03-10 RX ADMIN — LORATADINE 10 MG: 10 TABLET ORAL at 10:58

## 2020-03-10 RX ADMIN — SODIUM CHLORIDE, SODIUM LACTATE, POTASSIUM CHLORIDE, AND CALCIUM CHLORIDE: .6; .31; .03; .02 INJECTION, SOLUTION INTRAVENOUS at 08:59

## 2020-03-10 RX ADMIN — SODIUM CHLORIDE 3 G: 9 INJECTION, SOLUTION INTRAVENOUS at 17:42

## 2020-03-10 RX ADMIN — PROPOFOL 150 MG: 10 INJECTION, EMULSION INTRAVENOUS at 09:18

## 2020-03-10 RX ADMIN — FENTANYL CITRATE 50 MCG: 50 INJECTION, SOLUTION INTRAMUSCULAR; INTRAVENOUS at 09:27

## 2020-03-10 RX ADMIN — SODIUM CHLORIDE 3 G: 9 INJECTION, SOLUTION INTRAVENOUS at 22:55

## 2020-03-10 RX ADMIN — ACETAMINOPHEN 975 MG: 325 TABLET, FILM COATED ORAL at 05:00

## 2020-03-10 RX ADMIN — AZELASTINE HYDROCHLORIDE 1 SPRAY: 137 SPRAY, METERED NASAL at 08:24

## 2020-03-10 NOTE — ASSESSMENT & PLAN NOTE
Likely due to acute ostitis media, associated with nausea, vomiting, and gait instability x1 day  Was given Valium 5 mg po x1 in ED with improvement in symptoms  · Meclizine 25 mg q8h  · PT eval appreciated- patient will need outpatient balance center

## 2020-03-10 NOTE — ASSESSMENT & PLAN NOTE
Complains of excessive postnasal drip for years  Follows outpatient ENT who has him on Sudafed, Zyrtec, Flonase and nasal washes    Has a follow-up with ENT on 3/13/2020  · Continue Flonase, Claritin, and Sudafed as needed  · Also started trial of Astelin nasal spray  · ENT recs appreciated, patient had been taken back to OR for additional drainage and placement of myringotomy tube

## 2020-03-10 NOTE — PROGRESS NOTES
Progress Note Joseph Rousseau 1960, 61 y o  male MRN: [id number]    Unit/Bed#: [address] [place] [address] Encounter: [id number]    Primary Care Provider: No primary care provider on file  Date and time admitted to hospital: 3/4/2020  4:24 PM        * Acute mastoiditis of right side  Assessment & Plan  Hx of post nasal drip and right middle ear effusion being treated by ENT with antihistamines and decongestants  Now with worsening pain, vertigo, nausea, and vomiting  CTA head and neck: 'Suspect right otomastoiditis  Pansinusitis '  · ENT consulted, recs appreciated  · Continue home antihistamines and decongestants  · Pain control:  Tylenol 975 mg q 8 hours scheduled,, toradol 15mg q6hr, oxycodone 5mg q4hr PRN  · S/p right myringotomy and aspiration on 3/6/20  · Patient received 4 days of IV Cefazolin + Flagyl, changed to 4 days of IV unasyn, will DC on PO Augmentin for total Abx for 4 weeks  · Discussed case with ENT, had recommended MRI but as patient was taken back to the OR for additional drainage and placement of myringotomy tube, cannot have ear plugs placed as per ENT  · Will recommend patient have follow-up MRI after being seen by his ENT in [place]  · Patient will need ENT follow up after DC, which he will do in [place]    Otitis media  Assessment & Plan  Otitis media present on admission, failed outpatient treatment with patient's ENT from [place] on antihistamines and decongestants  Patient receive 4 days of IV cefazolin and Flagyl, currently on day 4 of IV Unasyn  Plan to discharge on oral Augmentin for total of antibiotic treatment for 4 weeks  As noted above case was discussed with ENT, patient had been taken back to OR for additional drainage and placement of myringotomy tube  Post-nasal drip  Assessment & Plan  Complains of excessive postnasal drip for years  Follows outpatient ENT who has him on Sudafed, Zyrtec, Flonase and nasal washes    Has a follow-up with ENT on 3/13/2020  · Continue Flonase, Claritin, and Sudafed as needed  · Also started trial of Astelin nasal spray  · ENT recs appreciated, patient had been taken back to OR for additional drainage and placement of myringotomy tube      History of diffuse large B cell lymphoma ([facility] [address] )  Assessment & Plan  Has been in remission since 2015  · Continue home medication, Acyclovir 400 mg twice daily  · Outpatient Oncology follow-up    Class 2 obesity due to excess calories without serious comorbidity with body mass index (BMI) of 35 0 to 35 9 in adult  Assessment & Plan  Body mass index is 35 8 kg/m²  Would benefit from weight loss    Vertigo  Assessment & Plan  Likely due to acute ostitis media, associated with nausea, vomiting, and gait instability x1 day  Was given Valium 5 mg po x1 in ED with improvement in symptoms  · Meclizine 25 mg q8h  · PT eval appreciated- patient will need outpatient [facility] center       Sepsis (HCC)resolved as of 3/7/2020  Assessment & Plan  POA, as evidence by tachycardia and leukocytosis in the setting of otomastoiditis   · blood cultures- negative at 72 hours  · Monitor WBC- normalized  · Patient afebrile        VTE Pharmacologic Prophylaxis:   Pharmacologic: Enoxaparin (Lovenox)  Mechanical VTE Prophylaxis in Place: Yes    Patient Centered Rounds: I have performed bedside rounds with nursing staff today  Discussions with Specialists or Other Care Team Provider:  Multi disciplinary team     Education and Discussions with Family / Patient:  I spoke with the patient at the bedside, I have answered all questions to the best of my abilities  Time Spent for Care: 30 minutes  More than 50% of total time spent on counseling and coordination of care as described above      Current Length of Stay: 6 day(s)    Current Patient Status: Inpatient   Certification Statement: The patient will continue to require additional inpatient hospital stay due to Return to OR for drainage of right ear, placement of myringotomy tube, repeat labs in a  m   Continued IV antibiotics  Discharge Plan:  Most likely stable for discharge in the next 24 hours pending patient's progress  Code Status: Level 1 - Full Code      Subjective:   Patient seen sitting comfortably in chair, denies headache, dizziness, shortness of breath, chest pain, nausea, vomiting or diarrhea  Had just returned from the OR, reports that the pain and dizziness have improved  Objective:     Vitals:   Temp (24hrs), Av 8 °F (36 6 °C), Min:97 °F (36 1 °C), Max:98 2 °F (36 8 °C)    Temp:  [97 °F (36 1 °C)-98 2 °F (36 8 °C)] 98 2 °F (36 8 °C)  HR:  [66-81] 73  Resp:  [16-20] 18  BP: (111-172)/() 172/110  SpO2:  [92 %-100 %] 92 %  Body mass index is 35 8 kg/m²  Input and Output Summary (last 24 hours): Intake/Output Summary (Last 24 hours) at 3/10/2020 194  Last data filed at 3/10/2020 1012  Gross per 24 hour   Intake 250 ml   Output 0 ml   Net 250 ml       Physical Exam:     Physical Exam   Constitutional: He is oriented to person, place, and time  He appears well-nourished  No distress  HENT:   Head: Normocephalic  Eyes: Pupils are equal, round, and reactive to light  Conjunctivae and EOM are normal    Neck: Normal range of motion  Cardiovascular: Normal rate and regular rhythm  Pulmonary/Chest: Effort normal and breath sounds normal    Abdominal: Soft  Bowel sounds are normal    Genitourinary:   Genitourinary Comments: Voiding spontaneously  Musculoskeletal: Normal range of motion  Neurological: He is alert and oriented to person, place, and time  Skin: Skin is warm and dry  Capillary refill takes less than 2 seconds  Psychiatric: He has a normal mood and affect  His behavior is normal  Judgment and thought content normal    Nursing note and vitals reviewed        Additional Data:     Labs:    Results from last 7 days   Lab Units 03/10/20  0504   WBC Thousand/uL 8 38   HEMOGLOBIN g/dL 14 2   HEMATOCRIT % 44 3   PLATELETS Thousands/uL 205   NEUTROS PCT % 66   LYMPHS PCT % 23   MONOS PCT % 9   EOS PCT % 1     Results from last 7 days   Lab Units 03/08/20  0641  03/05/20  0524   POTASSIUM mmol/L 3 7   < > 3 6   CHLORIDE mmol/L 101   < > 101   CO2 mmol/L 31   < > 28   BUN mg/dL 11   < > 13   CREATININE mg/dL 0 94   < > 1 09   CALCIUM mg/dL 8 5   < > 8 5   ALK PHOS U/L  --   --  70   ALT U/L  --   --  18   AST U/L  --   --  11    < > = values in this interval not displayed  Results from last 7 days   Lab Units 03/04/20  1651   INR  1 07       * I Have Reviewed All Lab Data Listed Above  * Additional Pertinent Lab Tests Reviewed: All Labs Within Last 24 Hours Reviewed    Imaging:    Imaging Reports Reviewed Today Include:  None  Imaging Personally Reviewed by Myself Includes:  None  Recent Cultures (last 7 days):     Results from last 7 days   Lab Units 03/09/20  1750 03/04/20 2001   BLOOD CULTURE   --  No Growth After 5 Days  No Growth After 5 Days     GRAM STAIN RESULT  Rare Polys*  1+ Gram positive rods*  Rare Budding yeast*  Rare Gram positive cocci in pairs*  Rare Gram negative rods*  --        Last 24 Hours Medication List:     Current Facility-Administered Medications:  acetaminophen 975 mg Oral [facility] Sahara Cao MD    acyclovir 400 mg Oral Q12H [address] Sahara Cao MD    aluminum-magnesium hydroxide-simethicone 30 mL Oral Q6H PRN Sahara Cao MD    ampicillin-sulbactam 3 g Intravenous Q6H Sahara Cao MD Last Rate: 3 g (03/10/20 1742)   azelastine 1 spray Each Nare BID Sahara Cao MD    ciprofloxacin-dexamethasone 5 drop Right Ear BID Sahara Cao MD    diazepam 5 mg Oral Q6H PRN Sahara Cao MD    docusate sodium 100 mg Oral BID Sahara Cao MD    enoxaparin 40 mg Subcutaneous Q24H [address] Sahara Cao MD    fluticasone 1 spray Each Nare Daily Sahara Cao MD    HYDROcodone-acetaminophen 1 tablet Oral Q4H PRN Sahara Cao MD    lactated ringers 100 mL/hr Intravenous Continuous Sahara Cao MD Last Rate: 100 mL/hr (03/10/20 0497)   loratadine 10 mg Oral Daily Young Wilcox MD    meclizine 25 mg Oral Q8H PRN Young Wilcox MD    melatonin 6 mg Oral HS Young Wilcox MD    metoclopramide 10 mg Intravenous Q6H PRN Young Wilcox MD    morphine injection 2 mg Intravenous Q4H PRN Young Wilcox MD    ondansetron 4 mg Intravenous Q6H PRN Young Wilcox MD    oxyCODONE 10 mg Oral Q4H PRN Young Wilcox MD    oxyCODONE 5 mg Oral Q4H PRN Young Wilcox MD    pantoprazole 40 mg Oral Early Morning Young Wilcox MD    polyethylene glycol 17 g Oral Daily PRN Young Wilcox MD    pseudoephedrine 30 mg Oral Q6H PRN Young Wilcox MD    saccharomyces boulardii 250 mg Oral BID Young Wilcox MD         Today, Patient Was Seen By: EDITA Wise    ** Please Note: Dictation voice to text software may have been used in the creation of this document   **

## 2020-03-10 NOTE — ASSESSMENT & PLAN NOTE
Hx of post nasal drip and right middle ear effusion being treated by ENT with antihistamines and decongestants  Now with worsening pain, vertigo, nausea, and vomiting  CTA head and neck: 'Suspect right otomastoiditis  Pansinusitis '  · ENT consulted, recs appreciated  · Continue home antihistamines and decongestants  · Pain control:  Tylenol 975 mg q 8 hours scheduled,, toradol 15mg q6hr, oxycodone 5mg q4hr PRN  · S/p right myringotomy and aspiration on 3/6/20  · Patient received 4 days of IV Cefazolin + Flagyl, changed to 4 days of IV unasyn, will DC on PO Augmentin for total Abx for 4 weeks  · Discussed case with ENT, had recommended MRI but as patient was taken back to the OR for additional drainage and placement of myringotomy tube, cannot have ear plugs placed as per ENT  · Will recommend patient have follow-up MRI after being seen by his ENT in [place]    · Patient will need ENT follow up after DC, which he will do in [place]

## 2020-03-10 NOTE — ANESTHESIA PREPROCEDURE EVALUATION
Review of Systems/Medical History  Patient summary reviewed    No history of anesthetic complications     Cardiovascular  Exercise tolerance (METS): >4,  Hypertension , No dysrhythmias , No angina ,    Pulmonary  Negative pulmonary ROS No shortness of breath,        GI/Hepatic  Negative GI/hepatic ROS               Endo/Other    Obesity    GYN       Hematology    Lymphoma   Musculoskeletal       Neurology   Psychology           Physical Exam    Airway    Mallampati score: II  TM Distance: >3 FB  Neck ROM: full     Dental   upper dentures and lower dentures,     Cardiovascular  Rhythm: regular, Rate: normal,     Pulmonary  Breath sounds clear to auscultation,     Other Findings        Anesthesia Plan  ASA Score- 3     Anesthesia Type- general with ASA Monitors  Additional Monitors:   Airway Plan: LMA  Plan Factors-  Patient did not smoke on day of surgery  Induction- intravenous  Postoperative Plan- Plan for postoperative opioid use  Planned trial extubation    Informed Consent- Anesthetic plan and risks discussed with patient  I personally reviewed this patient with the CRNA  Discussed and agreed on the Anesthesia Plan with the CRNA  Leah Chairez

## 2020-03-10 NOTE — ASSESSMENT & PLAN NOTE
Otitis media present on admission, failed outpatient treatment with patient's ENT from [place] on antihistamines and decongestants  Patient receive 4 days of IV cefazolin and Flagyl, currently on day 4 of IV Unasyn  Plan to discharge on oral Augmentin for total of antibiotic treatment for 4 weeks  As noted above case was discussed with ENT, patient had been taken back to OR for additional drainage and placement of myringotomy tube

## 2020-03-10 NOTE — OP NOTE
OPERATIVE REPORT  PATIENT NAME: Keyona Hugo    :  1960  MRN: [id number]  Pt Location: [facility] OR ROOM 04    SURGERY DATE: 3/10/2020    Surgeon(s) and Role:     * Amna Stock MD - Primary    Preop Diagnosis:  Otitis media [H66 90]    Post-Op Diagnosis Codes:     * Otitis media [H66 90]    Procedure(s) (LRB):  MYRINGOTOMY W/ INSERTION VENTILATION TUBE EAR (Right)    Specimen(s):  * No specimens in log *    Estimated Blood Loss:   Minimal    Drains:  Open Drain Right Other (Comment) (Active)   Number of days: 0       Anesthesia Type:   General    Operative Indications:  Otitis media [H66 90]  Otitis externa    Operative Findings:  Right suppurative otitis media  Right otitis externa    Complications:   None    Procedure and Technique:  After induction of general anesthesia via LMA, the patient was draped in the sterile fashion  The right external auditory canal was viewed using the operating microscope  The canal was boggy, and the skin shedding superficially  The tympanic membrane was opacified, thickened, and inflamed  An anteroinferior myringotomy was performed, and purulent effusion was suctioned from the middle ear  A beveled [place] fluoroplastic myringotomy tube was inserted  A few drops of Afrin were used to control bleeding, as the edges of the myringotomy bled owing to the inflammation of the tympanic membrane  Ofloxicin drops were drawn up in a 3cc syringe, fitted with an angiocath, and were used to flush the middle ear by instilling it via the myringotomy tube  This helped flush the middle ear of residual pus  The ear canal was irrigated with ofloxacin drops as well  Then the left external auditory canal was viewed using the operating microscope  The left canal was found to be normal, and the left tympanic membrane normal and intact, and there was no effusion seen  At the end of the procedure, all instruments were removed       I was present for the entire procedure    Patient Disposition:  PACU     SIGNATURE: Nick Navarro MD  DATE: March 10, 2020  TIME: 9:49 AM

## 2020-03-10 NOTE — PLAN OF CARE
Problem: Potential for Falls  Goal: Patient will remain free of falls  Description  INTERVENTIONS:  - Assess patient frequently for physical needs  -  Identify cognitive and physical deficits and behaviors that affect risk of falls    -  [place] fall precautions as indicated by assessment   - Educate patient/family on patient safety including physical limitations  - Instruct patient to call for assistance with activity based on assessment  - Modify environment to reduce risk of injury  - Consider OT/PT consult to assist with strengthening/mobility  Outcome: Progressing  Patient maintained safe ambulation     Problem: PAIN - ADULT  Goal: Verbalizes/displays adequate comfort level or baseline comfort level  Description  Interventions:  - Encourage patient to monitor pain and request assistance  - Assess pain using appropriate pain scale  - Administer analgesics based on type and severity of pain and evaluate response  - Implement non-pharmacological measures as appropriate and evaluate response  - Consider cultural and social influences on pain and pain management  - Notify physician/advanced practitioner if interventions unsuccessful or patient reports new pain  Outcome: Progressing   Patient free from pain after procedure  Problem: INFECTION - ADULT  Goal: Absence or prevention of progression during hospitalization  Description  INTERVENTIONS:  - Assess and monitor for signs and symptoms of infection  - Monitor lab/diagnostic results  - Monitor all insertion sites, i e  indwelling lines, tubes, and drains  - Monitor endotracheal if appropriate and nasal secretions for changes in amount and color  - [place] appropriate cooling/warming therapies per order  - Administer medications as ordered  - Instruct and encourage patient and family to use good hand hygiene technique  - Identify and instruct in appropriate isolation precautions for identified infection/condition  Outcome: Progressing   Patient reports no pain  Problem: SAFETY ADULT  Goal: Patient will remain free of falls  Description  INTERVENTIONS:  - Assess patient frequently for physical needs  -  Identify cognitive and physical deficits and behaviors that affect risk of falls  -  [place] fall precautions as indicated by assessment   - Educate patient/family on patient safety including physical limitations  - Instruct patient to call for assistance with activity based on assessment  - Modify environment to reduce risk of injury  - Consider OT/PT consult to assist with strengthening/mobility  Outcome: Progressing  Patient maintains baseline mobility     Problem: DISCHARGE PLANNING  Goal: Discharge to home or other facility with appropriate resources  Description  INTERVENTIONS:  - Identify barriers to discharge w/patient and caregiver  - Arrange for needed discharge resources and transportation as appropriate  - Identify discharge learning needs (meds, wound care, etc )  - Arrange for interpretive services to assist at discharge as needed  - Refer to Case Management Department for coordinating discharge planning if the patient needs post-hospital services based on physician/advanced practitioner order or complex needs related to functional status, cognitive ability, or social support system  Outcome: Progressing  Discussed discharge plan with patient     Problem: Knowledge Deficit  Goal: Patient/family/caregiver demonstrates understanding of disease process, treatment plan, medications, and discharge instructions  Description  Complete learning assessment and assess knowledge base    Interventions:  - Provide teaching at level of understanding  - Provide teaching via preferred learning methods  Outcome: Progressing     Problem: MUSCULOSKELETAL - ADULT  Goal: Maintain or return mobility to safest level of function  Description  INTERVENTIONS:  - Assess patient's ability to carry out ADLs; assess patient's baseline for ADL function and identify physical deficits which impact ability to perform ADLs (bathing, care of mouth/teeth, toileting, grooming, dressing, etc )  - Assess/evaluate cause of self-care deficits   - Assess range of motion  - Assess patient's mobility  - Assess patient's need for assistive devices and provide as appropriate  - Encourage maximum independence but intervene and supervise when necessary  - Involve family in performance of ADLs  - Assess for home care needs following discharge   - Consider OT consult to assist with ADL evaluation and planning for discharge  - Provide patient education as appropriate  Outcome: Progressing   Patient maintains baseline mobility

## 2020-03-10 NOTE — PROGRESS NOTES
Progress Note - Daphne Linder 61 y o  male MRN: [id number]    Unit/Bed#: OR [place] Encounter: [id number]      Assessment:  Right otitis media and otitis externa, now s/p right M&T  Plan:  Continue antibiotics and ear drops  Don't get any water in the right ear  Subjective:   Right ear still throbs, but improving  Objective:     Vitals: Blood pressure 111/89, pulse 71, temperature 97 9 °F (36 6 °C), temperature source Oral, resp  rate 16, height 6' (1 829 m), weight 120 kg (264 lb), SpO2 100 %  ,Body mass index is 35 8 kg/m²  Intake/Output Summary (Last 24 hours) at 3/10/2020 0959  Last data filed at 3/10/2020 0948  Gross per 24 hour   Intake 200 ml   Output    Net 200 ml       Physical Exam:   See operative note     Invasive Devices     Peripheral Intravenous Line            Peripheral IV 03/08/20 Left Hand 2 days          Drain            Open Drain Right Other (Comment) less than 1 day                Lab, Imaging and other studies: I have personally reviewed pertinent reports      VTE Pharmacologic Prophylaxis: Sequential compression device (Venodyne)   VTE Mechanical Prophylaxis: sequential compression device

## 2020-03-11 VITALS
BODY MASS INDEX: 35.76 KG/M2 | HEART RATE: 68 BPM | HEIGHT: 72 IN | TEMPERATURE: 97.9 F | RESPIRATION RATE: 20 BRPM | WEIGHT: 264 LBS | OXYGEN SATURATION: 94 % | DIASTOLIC BLOOD PRESSURE: 97 MMHG | SYSTOLIC BLOOD PRESSURE: 156 MMHG

## 2020-03-11 LAB
BACTERIA EAR AEROBE CULT: NO GROWTH
GRAM STN SPEC: ABNORMAL

## 2020-03-11 PROCEDURE — 99239 HOSP IP/OBS DSCHRG MGMT >30: CPT | Performed by: NURSE PRACTITIONER

## 2020-03-11 RX ORDER — AMOXICILLIN AND CLAVULANATE POTASSIUM 875; 125 MG/1; MG/1
1 TABLET, FILM COATED ORAL EVERY 12 HOURS SCHEDULED
Status: DISCONTINUED | OUTPATIENT
Start: 2020-03-11 | End: 2020-03-11 | Stop reason: HOSPADM

## 2020-03-11 RX ORDER — FLUTICASONE PROPIONATE 50 MCG
1 SPRAY, SUSPENSION (ML) NASAL DAILY
Refills: 0
Start: 2020-03-11

## 2020-03-11 RX ORDER — MECLIZINE HYDROCHLORIDE 25 MG/1
25 TABLET ORAL EVERY 8 HOURS PRN
Qty: 15 TABLET | Refills: 0 | Status: SHIPPED | OUTPATIENT
Start: 2020-03-11

## 2020-03-11 RX ORDER — SACCHAROMYCES BOULARDII 250 MG
250 CAPSULE ORAL 2 TIMES DAILY
Qty: 20 CAPSULE | Refills: 0 | Status: SHIPPED | OUTPATIENT
Start: 2020-03-11 | End: 2020-03-21

## 2020-03-11 RX ORDER — ACYCLOVIR 200 MG/1
400 CAPSULE ORAL EVERY 12 HOURS SCHEDULED
Refills: 0
Start: 2020-03-11 | End: 2020-03-21

## 2020-03-11 RX ORDER — AMOXICILLIN AND CLAVULANATE POTASSIUM 875; 125 MG/1; MG/1
1 TABLET, FILM COATED ORAL EVERY 12 HOURS SCHEDULED
Qty: 20 TABLET | Refills: 0 | Status: SHIPPED | OUTPATIENT
Start: 2020-03-11 | End: 2020-03-21

## 2020-03-11 RX ORDER — LORATADINE 10 MG/1
10 TABLET ORAL DAILY
Refills: 0
Start: 2020-03-12

## 2020-03-11 RX ORDER — PSEUDOEPHEDRINE HYDROCHLORIDE 30 MG/1
30 TABLET ORAL EVERY 6 HOURS PRN
Qty: 30 TABLET | Refills: 0
Start: 2020-03-11

## 2020-03-11 RX ORDER — CIPROFLOXACIN AND DEXAMETHASONE 3; 1 MG/ML; MG/ML
5 SUSPENSION/ DROPS AURICULAR (OTIC) 2 TIMES DAILY
Qty: 7.5 ML | Refills: 0
Start: 2020-03-11

## 2020-03-11 RX ORDER — HYDROCODONE BITARTRATE AND ACETAMINOPHEN 5; 325 MG/1; MG/1
1 TABLET ORAL EVERY 4 HOURS PRN
Qty: 8 TABLET | Refills: 0 | Status: SHIPPED | OUTPATIENT
Start: 2020-03-11 | End: 2020-03-14

## 2020-03-11 RX ADMIN — Medication 250 MG: at 09:45

## 2020-03-11 RX ADMIN — SODIUM CHLORIDE 3 G: 9 INJECTION, SOLUTION INTRAVENOUS at 06:00

## 2020-03-11 RX ADMIN — ENOXAPARIN SODIUM 40 MG: 40 INJECTION SUBCUTANEOUS at 09:45

## 2020-03-11 RX ADMIN — SODIUM CHLORIDE 3 G: 9 INJECTION, SOLUTION INTRAVENOUS at 11:14

## 2020-03-11 RX ADMIN — HYDROCODONE BITARTRATE AND ACETAMINOPHEN 1 TABLET: 5; 325 TABLET ORAL at 11:52

## 2020-03-11 RX ADMIN — LORATADINE 10 MG: 10 TABLET ORAL at 09:46

## 2020-03-11 RX ADMIN — SODIUM CHLORIDE, SODIUM LACTATE, POTASSIUM CHLORIDE, AND CALCIUM CHLORIDE 100 ML/HR: .6; .31; .03; .02 INJECTION, SOLUTION INTRAVENOUS at 02:58

## 2020-03-11 RX ADMIN — AZELASTINE HYDROCHLORIDE 1 SPRAY: 137 SPRAY, METERED NASAL at 09:46

## 2020-03-11 RX ADMIN — ACYCLOVIR 400 MG: 200 CAPSULE ORAL at 09:47

## 2020-03-11 RX ADMIN — CIPROFLOXACIN AND DEXAMETHASONE 5 DROP: 3; 1 SUSPENSION/ DROPS AURICULAR (OTIC) at 09:47

## 2020-03-11 NOTE — PLAN OF CARE
Problem: Potential for Falls  Goal: Patient will remain free of falls  Description  INTERVENTIONS:  - Assess patient frequently for physical needs  -  Identify cognitive and physical deficits and behaviors that affect risk of falls    -  [place] fall precautions as indicated by assessment   - Educate patient/family on patient safety including physical limitations  - Instruct patient to call for assistance with activity based on assessment  - Modify environment to reduce risk of injury  - Consider OT/PT consult to assist with strengthening/mobility  Outcome: Progressing     Problem: PAIN - ADULT  Goal: Verbalizes/displays adequate comfort level or baseline comfort level  Description  Interventions:  - Encourage patient to monitor pain and request assistance  - Assess pain using appropriate pain scale  - Administer analgesics based on type and severity of pain and evaluate response  - Implement non-pharmacological measures as appropriate and evaluate response  - Consider cultural and social influences on pain and pain management  - Notify physician/advanced practitioner if interventions unsuccessful or patient reports new pain  Outcome: Progressing     Problem: INFECTION - ADULT  Goal: Absence or prevention of progression during hospitalization  Description  INTERVENTIONS:  - Assess and monitor for signs and symptoms of infection  - Monitor lab/diagnostic results  - Monitor all insertion sites, i e  indwelling lines, tubes, and drains  - Monitor endotracheal if appropriate and nasal secretions for changes in amount and color  - [place] appropriate cooling/warming therapies per order  - Administer medications as ordered  - Instruct and encourage patient and family to use good hand hygiene technique  - Identify and instruct in appropriate isolation precautions for identified infection/condition  Outcome: Progressing     Problem: SAFETY ADULT  Goal: Patient will remain free of falls  Description  INTERVENTIONS:  - Assess patient frequently for physical needs  -  Identify cognitive and physical deficits and behaviors that affect risk of falls  -  [place] fall precautions as indicated by assessment   - Educate patient/family on patient safety including physical limitations  - Instruct patient to call for assistance with activity based on assessment  - Modify environment to reduce risk of injury  - Consider OT/PT consult to assist with strengthening/mobility  Outcome: Progressing     Problem: DISCHARGE PLANNING  Goal: Discharge to home or other facility with appropriate resources  Description  INTERVENTIONS:  - Identify barriers to discharge w/patient and caregiver  - Arrange for needed discharge resources and transportation as appropriate  - Identify discharge learning needs (meds, wound care, etc )  - Arrange for interpretive services to assist at discharge as needed  - Refer to Case Management Department for coordinating discharge planning if the patient needs post-hospital services based on physician/advanced practitioner order or complex needs related to functional status, cognitive ability, or social support system  Outcome: Progressing     Problem: Knowledge Deficit  Goal: Patient/family/caregiver demonstrates understanding of disease process, treatment plan, medications, and discharge instructions  Description  Complete learning assessment and assess knowledge base    Interventions:  - Provide teaching at level of understanding  - Provide teaching via preferred learning methods  Outcome: Progressing     Problem: MUSCULOSKELETAL - ADULT  Goal: Maintain or return mobility to safest level of function  Description  INTERVENTIONS:  - Assess patient's ability to carry out ADLs; assess patient's baseline for ADL function and identify physical deficits which impact ability to perform ADLs (bathing, care of mouth/teeth, toileting, grooming, dressing, etc )  - Assess/evaluate cause of self-care deficits   - Assess range of motion  - Assess patient's mobility  - Assess patient's need for assistive devices and provide as appropriate  - Encourage maximum independence but intervene and supervise when necessary  - Involve family in performance of ADLs  - Assess for home care needs following discharge   - Consider OT consult to assist with ADL evaluation and planning for discharge  - Provide patient education as appropriate  Outcome: Progressing

## 2020-03-11 NOTE — NURSING NOTE
AVS SUMMARY AND WRITTEN PRESCRIPTION GIVEN AND REVIEWED  PT  VERBALIZE UNDERSTANDING   PT  OWN MEDS FROM PHARMACY RETURNED  IV ACCESS REMOVED AND PATIENT TOLERATED  LEFT THE FLOOR WITH HIS PERSONAL BELONGINGS VIA WHEELCHAIR

## 2020-03-11 NOTE — ASSESSMENT & PLAN NOTE
Complains of excessive postnasal drip for years  Follows outpatient ENT who has him on Sudafed, Zyrtec, Flonase and nasal washes  Has a follow-up with ENT on 3/13/2020  · Continue Flonase, Claritin, and Sudafed as needed  · Also started trial of Astelin nasal spray - discontinue on discharge    · ENT recs appreciated, patient had been taken back to OR for additional drainage and placement of myringotomy tube

## 2020-03-11 NOTE — DISCHARGE SUMMARY
Discharge- Haris Pelletier 1960, 61 y o  male MRN: [id number]    Unit/Bed#: [address] [place] [address] Encounter: [id number]    Primary Care Provider: No primary care provider on file  Date and time admitted to hospital: 3/4/2020  4:24 PM        * Acute mastoiditis of right side  Assessment & Plan  Hx of post nasal drip and right middle ear effusion being treated by ENT with antihistamines and decongestants  Now with worsening pain, vertigo, nausea, and vomiting  CTA head and neck: 'Suspect right otomastoiditis  Pansinusitis '  · ENT consulted, recs appreciated  Chronically sclerotic mastoid and decreased middle ear space on the right, not related to his current condition, but likely secondary to childhood ear infections  · Continue home antihistamines and decongestants  · S/p bedside right myringotomy and aspiration on 3/6/20  · Status post right myringotomy with insertion ventilation tube on 3/10  · Ear culture no growth  · Patient received 5 days of IV Cefazolin + Flagyl, changed to IV unasyn, will DC on PO Augmentin for 10 more days per discussion with ENT  · Team discussed case with ENT, recommended MRI, recommended patient have follow-up MRI after being seen by his ENT in [place]  · Patient will need ENT follow up after DC, which he will do in [place]  · Patient does not have a PCP,advised patient to establish care with PCP in [place]  Otitis media  Assessment & Plan  Otitis media present on admission, failed outpatient treatment with [place] from [place] on antihistamines and decongestants  Antibiotic as above  Vertigo  Assessment & Plan  Likely due to acute ostitis media, associated with nausea, vomiting, and gait instability x1 day  Was given Valium 5 mg po x1 in ED with improvement in symptoms  · Will prescribe Meclizine 25 mg q8h p r n    · PT eval appreciated- patient will need outpatient balance center         Class 2 obesity due to excess calories without serious comorbidity with body mass index (BMI) of 35 0 to 35 9 in adult  Assessment & Plan  Body mass index is 35 8 kg/m²  Would benefit from weight loss    History of diffuse large B cell lymphoma (HCC)  Assessment & Plan  Has been in remission since 2015  · Continue home medication, Acyclovir 400 mg twice daily  · Outpatient Oncology follow-up    Post-nasal drip  Assessment & Plan  Complains of excessive postnasal drip for years  Follows outpatient ENT who has him on Sudafed, Zyrtec, Flonase and nasal washes  Has a follow-up with ENT on 3/13/2020  · Continue Flonase, Claritin, and Sudafed as needed  · Also started trial of Astelin nasal spray - discontinue on discharge  · ENT recs appreciated, patient had been taken back to OR for additional drainage and placement of myringotomy tube          Discharging Physician / Practitioner: EDITA Bhakta  PCP: No primary care provider on file    Admission Date: 3/4/2020  Discharge Date: 03/11/20    Reason for Admission: Earache (c/o R ear discomfort with fluid in the ear for 6 weeks has seen ent and has follow up, balance getting worse, today had headache and started vomiting)        Resolved Problems  Date Reviewed: 3/11/2020          Resolved    Sepsis ([address] ) 3/7/2020     Resolved by  Héctor Luong MD          Consultations During Hospital Stay:  IP CONSULT TO ENT    Procedures Performed:     · S/p bedside right myringotomy and aspiration on 3/6/20  · Status post right myringotomy with insertion ventilation tube on 3/10    Significant Findings / Test Results:     · As below  Results from last 7 days   Lab Units 03/10/20  0504 03/08/20  0641 03/07/20  0600   WBC Thousand/uL 8 38 9 38 9 64   HEMOGLOBIN g/dL 14 2 14 4 13 8   PLATELETS Thousands/uL 205 180 157     Results from last 7 days   Lab Units 03/08/20  0641 03/07/20  0601 03/06/20  0532 03/05/20  0524 03/04/20  1651   SODIUM mmol/L 139 137 136 137 137   POTASSIUM mmol/L 3 7 4 0 3 7 3 6 4 4   CHLORIDE mmol/L 101 102 103 101 100   CO2 mmol/L 31 28 26 28 29 BUN mg/dL 11 14 15 13 10   CREATININE mg/dL 0 94 0 97 1 02 1 09 1 01   CALCIUM mg/dL 8 5 8 4 8 3 8 5 9 1   TOTAL BILIRUBIN mg/dL  --   --   --  0 80 0 90   ALK PHOS U/L  --   --   --  70 83   ALT U/L  --   --   --  18 23   AST U/L  --   --   --  11 11     Results from last 7 days   Lab Units 03/04/20  1651   INR  1 07     Results from last 7 days   Lab Units 03/04/20  1651   TROPONIN I ng/mL <0 02     No results found for: HGBA1C          Blood Culture:   Lab Results   Component Value Date    BLOODCX No Growth After 5 Days  03/04/2020    BLOODCX No Growth After 5 Days  03/04/2020     Urine Culture: No results found for: URINECX  Sputum Culture: No components found for: SPUTUMCX  Wound Culture: No results found for: WOUNDCULT     CTA head and neck with and without contrast   Final Result by Florence Perry MD (03/04 1914)         1  No evidence of acute intracranial hemorrhage  2  No evidence of hemodynamic significant stenosis, aneurysm or dissection  3  Suspect right otomastoiditis  4  Pansinusitis  Workstation performed: [id number]                Incidental Findings:   · None     Test Results Pending at Discharge (will require follow up): · None     Outpatient Tests Requested:  · None    Complications:  none    Reason for Admission:   Chief Complaint   Patient presents with    Earache     c/o R ear discomfort with fluid in the ear for 6 weeks has seen ent and has follow up, balance getting worse, today had headache and started vomiting       Hospital Course:     Per HPI: Stella Patel is a 61 y o  male patient with a PMH of lymphoma, obesity, postnasal drip who originally presented to the hospital on 3/4/2020 due to acute otitis media and otitis externa, status post right M&T on 3/10  CT concerning for acute right-sided mastoiditis on admission    Per ENT, chronically sclerotic mastoid and decreased middle ear space on the right, not related to current condition, likely secondary to childhood ear infections  Patient received IV Ancef + Flagyl, then Unasyn, will discharge patient on p o  Augmentin for 10 more days per discussion with ENT  Ear culture no growth  Patient will follow-up with ENT in [place]  Recommend MRI of brain after cleared by ENT in [place]  Will prescribe Norco p r n  On discharge  Patient was seen by PT, recommend PT in [facility]  Patient will follow-up with ENT in [place] post discharge  Will prescribe Antivert p r n  On discharge  Patient does not have a PCP, advised patient to establish care with primary in [place]  Patient understood  Hospital Course:    Please see above list of diagnoses and related plan for additional information  Condition at Discharge: good       Discharge Day Visit / Exam:     Subjective:  Patient denies ear pain, ear drainage, denies fever, chills  Slight dizzy when getting up  Denies chest pain, headaches, SOB, nausea, vomiting, diarrhea, constipation  Vitals: Blood Pressure: 156/97 (03/11/20 0944)  Pulse: 68 (03/11/20 0944)  Temperature: 97 9 °F (36 6 °C) (03/11/20 0944)  Temp Source: Oral (03/11/20 0944)  Respirations: 20 (03/11/20 0944)  Height: 6' (182 9 cm) (03/04/20 1944)  Weight - Scale: 120 kg (264 lb) (03/04/20 1620)  SpO2: 94 % (03/11/20 0944)  Exam:   Physical Exam   Constitutional: He is oriented to person, place, and time  He appears well-developed and well-nourished  Patient is obese   HENT:   Head: Normocephalic and atraumatic  Neck: Normal range of motion  Neck supple  No JVD present  No tracheal deviation present  No thyromegaly present  Cardiovascular: Normal rate and regular rhythm  No murmur heard  Pulmonary/Chest: Effort normal and breath sounds normal  No respiratory distress  He has no wheezes  He has no rales  Abdominal: Soft  Bowel sounds are normal  He exhibits no distension  There is no tenderness  There is no guarding  Musculoskeletal: Normal range of motion   He exhibits no edema, tenderness or deformity  Neurological: He is alert and oriented to person, place, and time  Skin: Skin is warm and dry  Psychiatric: He has a normal mood and affect  Judgment normal    Nursing note and vitals reviewed  Discharge instructions/Information to patient and family:   See after visit summary for information provided to patient and family  Provisions for Follow-Up Care:  See after visit summary for information related to follow-up care and any pertinent home health orders  Disposition:     Home    Planned Readmission: no     Discharge Statement:  I spent 35 minutes discharging the patient  This time was spent on the day of discharge  I had direct contact with the patient on the day of discharge  Greater than 50% of the total time was spent examining patient, answering all patient questions, arranging and discussing plan of care with patient as well as directly providing post-discharge instructions  Additional time then spent on discharge activities  Discharge Medications:  See after visit summary for reconciled discharge medications provided to patient and family        ** Please Note: This note has been constructed using a voice recognition system **

## 2020-03-11 NOTE — ASSESSMENT & PLAN NOTE
Otitis media present on admission, failed outpatient treatment with [place] antihistamines and decongestants  Antibiotic as above

## 2020-03-11 NOTE — ASSESSMENT & PLAN NOTE
Hx of post nasal drip and right middle ear effusion being treated by ENT with antihistamines and decongestants  Now with worsening pain, vertigo, nausea, and vomiting  CTA head and neck: 'Suspect right otomastoiditis  Pansinusitis '  · ENT consulted, recs appreciated  Chronically sclerotic mastoid and decreased middle ear space on the right, not related to his current condition, but likely secondary to childhood ear infections  · Continue home antihistamines and decongestants  · S/p bedside right myringotomy and aspiration on 3/6/20  · Status post right myringotomy with insertion ventilation tube on 3/10  · Ear culture no growth  · Patient received 5 days of IV Cefazolin + Flagyl, changed to IV unasyn, will DC on PO Augmentin for 10 more days per discussion with ENT  · Team discussed case with ENT, recommended MRI, recommended patient have follow-up MRI after being seen by his ENT in [place]  · Patient will need ENT follow up after DC, which he will do in [place]  · Patient does not have a PCP,advised patient to establish care with PCP in [place]

## 2020-03-11 NOTE — DISCHARGE INSTRUCTIONS
Take Augmentin every 12 hours for 10 more days, starting at 6:00 p m  Tonight  Do not get water into right ear  Continue ear drop till seen by ENT in [place]  Follow-up ENT in [place]  Recommend MRI of the brain once cleared by ENT  Follow-up PCP in 1 week  Recommend PT in balance center in [place] for dizziness/vertigo

## (undated) DEVICE — MAYO STAND COVER: Brand: CONVERTORS

## (undated) DEVICE — INTEGRA® KNIFE 1411050 10PK MYRINGOTOMY LANCE: Brand: INTEGRA®

## (undated) DEVICE — GLOVE SRG BIOGEL 7

## (undated) DEVICE — TUBING SUCTION 5MM X 12 FT

## (undated) DEVICE — 1200CC GUARDIAN II: Brand: GUARDIAN

## (undated) DEVICE — SPONGE RAYTEX

## (undated) DEVICE — SYRINGE 3ML LL

## (undated) DEVICE — COTTON BALLS: Brand: DEROYAL

## (undated) DEVICE — TOWEL SET X-RAY